# Patient Record
Sex: MALE | Race: WHITE | NOT HISPANIC OR LATINO | Employment: UNEMPLOYED
[De-identification: names, ages, dates, MRNs, and addresses within clinical notes are randomized per-mention and may not be internally consistent; named-entity substitution may affect disease eponyms.]

---

## 2017-01-03 ENCOUNTER — RECORDS - HEALTHEAST (OUTPATIENT)
Dept: ADMINISTRATIVE | Facility: OTHER | Age: 27
End: 2017-01-03

## 2017-01-09 ENCOUNTER — RECORDS - HEALTHEAST (OUTPATIENT)
Dept: ADMINISTRATIVE | Facility: OTHER | Age: 27
End: 2017-01-09

## 2017-01-11 ENCOUNTER — RECORDS - HEALTHEAST (OUTPATIENT)
Dept: ADMINISTRATIVE | Facility: OTHER | Age: 27
End: 2017-01-11

## 2017-02-01 ENCOUNTER — RECORDS - HEALTHEAST (OUTPATIENT)
Dept: ADMINISTRATIVE | Facility: OTHER | Age: 27
End: 2017-02-01

## 2017-02-06 ENCOUNTER — RECORDS - HEALTHEAST (OUTPATIENT)
Dept: ADMINISTRATIVE | Facility: OTHER | Age: 27
End: 2017-02-06

## 2017-02-06 LAB — HBA1C MFR BLD: 8.2 % (ref 0–5.6)

## 2017-02-07 ENCOUNTER — RECORDS - HEALTHEAST (OUTPATIENT)
Dept: ADMINISTRATIVE | Facility: OTHER | Age: 27
End: 2017-02-07

## 2017-02-08 ENCOUNTER — RECORDS - HEALTHEAST (OUTPATIENT)
Dept: ADMINISTRATIVE | Facility: OTHER | Age: 27
End: 2017-02-08

## 2017-02-13 ENCOUNTER — RECORDS - HEALTHEAST (OUTPATIENT)
Dept: ADMINISTRATIVE | Facility: OTHER | Age: 27
End: 2017-02-13

## 2017-02-26 ENCOUNTER — RECORDS - HEALTHEAST (OUTPATIENT)
Dept: ADMINISTRATIVE | Facility: OTHER | Age: 27
End: 2017-02-26

## 2017-02-27 ENCOUNTER — RECORDS - HEALTHEAST (OUTPATIENT)
Dept: ADMINISTRATIVE | Facility: OTHER | Age: 27
End: 2017-02-27

## 2017-03-02 ENCOUNTER — RECORDS - HEALTHEAST (OUTPATIENT)
Dept: ADMINISTRATIVE | Facility: OTHER | Age: 27
End: 2017-03-02

## 2017-03-10 ENCOUNTER — RECORDS - HEALTHEAST (OUTPATIENT)
Dept: ADMINISTRATIVE | Facility: OTHER | Age: 27
End: 2017-03-10

## 2017-03-16 ENCOUNTER — RECORDS - HEALTHEAST (OUTPATIENT)
Dept: ADMINISTRATIVE | Facility: OTHER | Age: 27
End: 2017-03-16

## 2017-04-05 ENCOUNTER — RECORDS - HEALTHEAST (OUTPATIENT)
Dept: ADMINISTRATIVE | Facility: OTHER | Age: 27
End: 2017-04-05

## 2017-04-29 ENCOUNTER — RECORDS - HEALTHEAST (OUTPATIENT)
Dept: ADMINISTRATIVE | Facility: OTHER | Age: 27
End: 2017-04-29

## 2017-05-18 ENCOUNTER — RECORDS - HEALTHEAST (OUTPATIENT)
Dept: ADMINISTRATIVE | Facility: OTHER | Age: 27
End: 2017-05-18

## 2017-05-18 LAB — HBA1C MFR BLD: 7.1 % (ref 0–5.6)

## 2017-05-21 ENCOUNTER — RECORDS - HEALTHEAST (OUTPATIENT)
Dept: ADMINISTRATIVE | Facility: OTHER | Age: 27
End: 2017-05-21

## 2017-05-22 ENCOUNTER — RECORDS - HEALTHEAST (OUTPATIENT)
Dept: ADMINISTRATIVE | Facility: OTHER | Age: 27
End: 2017-05-22

## 2017-05-26 ENCOUNTER — RECORDS - HEALTHEAST (OUTPATIENT)
Dept: ADMINISTRATIVE | Facility: OTHER | Age: 27
End: 2017-05-26

## 2017-06-05 ENCOUNTER — RECORDS - HEALTHEAST (OUTPATIENT)
Dept: ADMINISTRATIVE | Facility: OTHER | Age: 27
End: 2017-06-05

## 2017-08-18 ENCOUNTER — RECORDS - HEALTHEAST (OUTPATIENT)
Dept: ADMINISTRATIVE | Facility: OTHER | Age: 27
End: 2017-08-18

## 2017-08-22 ENCOUNTER — RECORDS - HEALTHEAST (OUTPATIENT)
Dept: ADMINISTRATIVE | Facility: OTHER | Age: 27
End: 2017-08-22

## 2017-08-31 ENCOUNTER — RECORDS - HEALTHEAST (OUTPATIENT)
Dept: ADMINISTRATIVE | Facility: OTHER | Age: 27
End: 2017-08-31

## 2017-09-30 ENCOUNTER — RECORDS - HEALTHEAST (OUTPATIENT)
Dept: ADMINISTRATIVE | Facility: OTHER | Age: 27
End: 2017-09-30

## 2017-10-01 ENCOUNTER — RECORDS - HEALTHEAST (OUTPATIENT)
Dept: ADMINISTRATIVE | Facility: OTHER | Age: 27
End: 2017-10-01

## 2017-10-04 ENCOUNTER — RECORDS - HEALTHEAST (OUTPATIENT)
Dept: ADMINISTRATIVE | Facility: OTHER | Age: 27
End: 2017-10-04

## 2017-10-05 ENCOUNTER — RECORDS - HEALTHEAST (OUTPATIENT)
Dept: ADMINISTRATIVE | Facility: OTHER | Age: 27
End: 2017-10-05

## 2017-10-07 ENCOUNTER — RECORDS - HEALTHEAST (OUTPATIENT)
Dept: ADMINISTRATIVE | Facility: OTHER | Age: 27
End: 2017-10-07

## 2017-10-10 ENCOUNTER — RECORDS - HEALTHEAST (OUTPATIENT)
Dept: ADMINISTRATIVE | Facility: OTHER | Age: 27
End: 2017-10-10

## 2017-10-26 ENCOUNTER — RECORDS - HEALTHEAST (OUTPATIENT)
Dept: ADMINISTRATIVE | Facility: OTHER | Age: 27
End: 2017-10-26

## 2017-12-01 ENCOUNTER — RECORDS - HEALTHEAST (OUTPATIENT)
Dept: ADMINISTRATIVE | Facility: OTHER | Age: 27
End: 2017-12-01

## 2018-01-17 ENCOUNTER — RECORDS - HEALTHEAST (OUTPATIENT)
Dept: ADMINISTRATIVE | Facility: OTHER | Age: 28
End: 2018-01-17

## 2018-01-23 ENCOUNTER — RECORDS - HEALTHEAST (OUTPATIENT)
Dept: ADMINISTRATIVE | Facility: OTHER | Age: 28
End: 2018-01-23

## 2018-01-24 ENCOUNTER — RECORDS - HEALTHEAST (OUTPATIENT)
Dept: ADMINISTRATIVE | Facility: OTHER | Age: 28
End: 2018-01-24

## 2018-03-01 ENCOUNTER — RECORDS - HEALTHEAST (OUTPATIENT)
Dept: ADMINISTRATIVE | Facility: OTHER | Age: 28
End: 2018-03-01

## 2018-03-02 ENCOUNTER — RECORDS - HEALTHEAST (OUTPATIENT)
Dept: ADMINISTRATIVE | Facility: OTHER | Age: 28
End: 2018-03-02

## 2018-04-09 ENCOUNTER — RECORDS - HEALTHEAST (OUTPATIENT)
Dept: ADMINISTRATIVE | Facility: OTHER | Age: 28
End: 2018-04-09

## 2018-10-04 ENCOUNTER — RECORDS - HEALTHEAST (OUTPATIENT)
Dept: ADMINISTRATIVE | Facility: OTHER | Age: 28
End: 2018-10-04

## 2019-01-24 ENCOUNTER — OFFICE VISIT - HEALTHEAST (OUTPATIENT)
Dept: FAMILY MEDICINE | Facility: CLINIC | Age: 29
End: 2019-01-24

## 2019-01-24 DIAGNOSIS — E11.9 TYPE 2 DIABETES MELLITUS WITHOUT COMPLICATION, WITHOUT LONG-TERM CURRENT USE OF INSULIN (H): ICD-10-CM

## 2019-01-24 DIAGNOSIS — I10 BENIGN ESSENTIAL HYPERTENSION: ICD-10-CM

## 2019-01-24 DIAGNOSIS — F41.8 DEPRESSION WITH ANXIETY: ICD-10-CM

## 2019-01-24 RX ORDER — HYDROCHLOROTHIAZIDE 25 MG/1
TABLET ORAL
Status: SHIPPED | COMMUNITY
Start: 2016-02-29 | End: 2022-04-22

## 2019-01-24 RX ORDER — LISINOPRIL/HYDROCHLOROTHIAZIDE 10-12.5 MG
1 TABLET ORAL DAILY
Qty: 90 TABLET | Refills: 3 | Status: SHIPPED | OUTPATIENT
Start: 2019-01-24 | End: 2022-04-22

## 2019-01-24 RX ORDER — PAROXETINE 20 MG/1
20 TABLET, FILM COATED ORAL DAILY
Qty: 30 TABLET | Refills: 2 | Status: SHIPPED | OUTPATIENT
Start: 2019-01-24 | End: 2022-04-22

## 2019-01-24 ASSESSMENT — MIFFLIN-ST. JEOR: SCORE: 1990.26

## 2019-01-29 ENCOUNTER — COMMUNICATION - HEALTHEAST (OUTPATIENT)
Dept: SCHEDULING | Facility: CLINIC | Age: 29
End: 2019-01-29

## 2019-02-13 ENCOUNTER — RECORDS - HEALTHEAST (OUTPATIENT)
Dept: HEALTH INFORMATION MANAGEMENT | Facility: CLINIC | Age: 29
End: 2019-02-13

## 2019-05-08 ENCOUNTER — OFFICE VISIT - HEALTHEAST (OUTPATIENT)
Dept: FAMILY MEDICINE | Facility: CLINIC | Age: 29
End: 2019-05-08

## 2019-05-08 DIAGNOSIS — R10.9 RIGHT FLANK PAIN: ICD-10-CM

## 2019-07-24 ENCOUNTER — OFFICE VISIT - HEALTHEAST (OUTPATIENT)
Dept: FAMILY MEDICINE | Facility: CLINIC | Age: 29
End: 2019-07-24

## 2019-07-24 DIAGNOSIS — R07.89 CHEST TIGHTNESS: ICD-10-CM

## 2019-07-24 DIAGNOSIS — R05.9 COUGH: ICD-10-CM

## 2019-07-24 RX ORDER — ALBUTEROL SULFATE 90 UG/1
2 AEROSOL, METERED RESPIRATORY (INHALATION) EVERY 6 HOURS PRN
Qty: 1 EACH | Refills: 0 | Status: SHIPPED | OUTPATIENT
Start: 2019-07-24 | End: 2022-04-26

## 2019-07-30 LAB
ATRIAL RATE - MUSE: 81 BPM
DIASTOLIC BLOOD PRESSURE - MUSE: NORMAL MMHG
INTERPRETATION ECG - MUSE: NORMAL
P AXIS - MUSE: 24 DEGREES
PR INTERVAL - MUSE: 142 MS
QRS DURATION - MUSE: 82 MS
QT - MUSE: 340 MS
QTC - MUSE: 394 MS
R AXIS - MUSE: 60 DEGREES
SYSTOLIC BLOOD PRESSURE - MUSE: NORMAL MMHG
T AXIS - MUSE: 60 DEGREES
VENTRICULAR RATE- MUSE: 81 BPM

## 2019-09-29 ENCOUNTER — OFFICE VISIT - HEALTHEAST (OUTPATIENT)
Dept: FAMILY MEDICINE | Facility: CLINIC | Age: 29
End: 2019-09-29

## 2019-09-29 DIAGNOSIS — H66.92 ACUTE OTITIS MEDIA, LEFT: ICD-10-CM

## 2020-03-21 ENCOUNTER — COMMUNICATION - HEALTHEAST (OUTPATIENT)
Dept: SCHEDULING | Facility: CLINIC | Age: 30
End: 2020-03-21

## 2021-05-28 NOTE — PROGRESS NOTES
Chief Complaint   Patient presents with     Abdominal Pain     right side pain when coughing xtoday 2019         HPI      Patient is here for moderate right flank pain started today. He also started a cough today. Coughing makes the right flank pain worsen. He denied nausea, vomiting, diarrhea, constipation, urinary symptoms. No hx of renal calculus, abdominal surgeries. No fever, chills.     ROS: Pertinent ROS noted in HPI.     Allergies   Allergen Reactions     Erythromycin-Sulfisoxazole Unknown       Patient Active Problem List   Diagnosis     Essential hypertension     Generalized anxiety disorder     Hyperlipidemia     Recurrent major depressive disorder (H)     Shortness of breath     Type 2 diabetes mellitus (H)     Tobacco dependence syndrome       Family History   Problem Relation Age of Onset     Stroke Father         just  of massive stroke last week       Social History     Socioeconomic History     Marital status: Single     Spouse name: Not on file     Number of children: Not on file     Years of education: Not on file     Highest education level: Not on file   Occupational History     Not on file   Social Needs     Financial resource strain: Not on file     Food insecurity:     Worry: Not on file     Inability: Not on file     Transportation needs:     Medical: Not on file     Non-medical: Not on file   Tobacco Use     Smoking status: Current Every Day Smoker     Last attempt to quit: 2019     Years since quittin.3     Smokeless tobacco: Never Used   Substance and Sexual Activity     Alcohol use: No     Drug use: No     Sexual activity: Not on file   Lifestyle     Physical activity:     Days per week: Not on file     Minutes per session: Not on file     Stress: Not on file   Relationships     Social connections:     Talks on phone: Not on file     Gets together: Not on file     Attends L[employer] day service: Not on file     Active member of club or organization: Not on file     Attends  meetings of clubs or organizations: Not on file     Relationship status: Not on file     Intimate partner violence:     Fear of current or ex partner: Not on file     Emotionally abused: Not on file     Physically abused: Not on file     Forced sexual activity: Not on file   Other Topics Concern     Not on file   Social History Narrative     Not on file         Objective:      Vitals:    05/08/19 1914 05/08/19 1916   BP: (!) 154/102 (!) 154/108   Patient Site: Right Arm Right Arm   Patient Position: Sitting Sitting   Cuff Size: Adult Large Adult Large   Pulse: 96    Resp: 16    Temp: 98.7  F (37.1  C)    TempSrc: Oral    SpO2: 97%    Weight: (!) 225 lb (102.1 kg)        Gen: well appearing  CV: RRR, normal S1S2, no M, R, G  Pulm: CTAB, normal effort  Abd: normal inspection, normal bowel sounds, mild pain to palpation at right flank, no mass/HSM    Assessment:    Right flank pain    Plan:  Patient near closing time. Recommended further evaluation in ER to rule out renal calculus vs other pathologies. However, patient declined going to ER. I explained to him the risks associated with not being thoroughly evaluated and treated tonight, including death and he verbalized understanding of associated risks. He said he will return to [facility] tomorrow.

## 2021-05-30 NOTE — PROGRESS NOTES
Chief Complaint   Patient presents with     poss Chest  Tightness     x2-3days Head pain come in go          HPI:      Patient is here for 2 days of cough, and one day of intermittent chest tightness with intermittent shortness of breath. No fever, chills, chest pain, abdominal pain. Patient is a heavy smoker.     ROS: Pertinent ROS noted in HPI.     Allergies   Allergen Reactions     Erythromycin-Sulfisoxazole Unknown       Patient Active Problem List   Diagnosis     Essential hypertension     Generalized anxiety disorder     Hyperlipidemia     Recurrent major depressive disorder (H)     Shortness of breath     Type 2 diabetes mellitus (H)     Tobacco dependence syndrome       Family History   Problem Relation Age of Onset     Stroke Father         just  of massive stroke last week       Social History     Socioeconomic History     Marital status: Single     Spouse name: Not on file     Number of children: Not on file     Years of education: Not on file     Highest education level: Not on file   Occupational History     Not on file   Social Needs     Financial resource strain: Not on file     Food insecurity:     Worry: Not on file     Inability: Not on file     Transportation needs:     Medical: Not on file     Non-medical: Not on file   Tobacco Use     Smoking status: Current Every Day Smoker     Last attempt to quit: 2019     Years since quittin.5     Smokeless tobacco: Current User   Substance and Sexual Activity     Alcohol use: No     Drug use: No     Sexual activity: Not on file   Lifestyle     Physical activity:     Days per week: Not on file     Minutes per session: Not on file     Stress: Not on file   Relationships     Social connections:     Talks on phone: Not on file     Gets together: Not on file     Attends [employer] service: Not on file     Active member of club or organization: Not on file     Attends meetings of clubs or organizations: Not on file     Relationship status: Not on file      Intimate partner violence:     Fear of current or ex partner: Not on file     Emotionally abused: Not on file     Physically abused: Not on file     Forced sexual activity: Not on file   Other Topics Concern     Not on file   Social History Narrative     Not on file         Objective:      Vitals:    07/24/19 1730 07/24/19 1732   BP: 158/82 145/85   Pulse: 98    Resp: 19    Temp: 98  F (36.7  C)    TempSrc: Oral    SpO2: 97%    Weight: 214 lb (97.1 kg)        Gen: NAD  CV: RRR, normal S1S2, no M, R, G  Pulm: CTAB, normal effort  Chest wall: normal palpation  Abd: normal inspection, normal bowel sounds, soft, no pain, no mass/HSM    EKG - normal sinus rhythm without acute abnormalities per my interpretation, discussed during visit.     Recent Results (from the past 24 hour(s))   Electrocardiogram Perform - Clinic   Result Value Ref Range    SYSTOLIC BLOOD PRESSURE  mmHg    DIASTOLIC BLOOD PRESSURE  mmHg    VENTRICULAR RATE 81 BPM    ATRIAL RATE 81 BPM    P-R INTERVAL 142 ms    QRS DURATION 82 ms    Q-T INTERVAL 340 ms    QTC CALCULATION (BEZET) 394 ms    P Axis 24 degrees    R AXIS 60 degrees    T AXIS 60 degrees    MUSE DIAGNOSIS       Normal sinus rhythm  Normal ECG  When compared with ECG of 01-NOV-2018 00:37,  No significant change was found       CXR - negative chest per my interpretation, discussed during visit.      Chest tightness  -     Electrocardiogram Perform - Clinic  -     albuterol (PROAIR HFA;PROVENTIL HFA;VENTOLIN HFA) 90 mcg/actuation inhaler; Inhale 2 puffs every 6 (six) hours as needed.    Cough  -     XR Chest 2 Views  -     albuterol (PROAIR HFA;PROVENTIL HFA;VENTOLIN HFA) 90 mcg/actuation inhaler; Inhale 2 puffs every 6 (six) hours as needed.      Normal exam. Patient appeared well. Suspect a combination of smoking with viral illness. Close f/u as directed.

## 2021-06-01 NOTE — PROGRESS NOTES
Assessment and Plan     Bryan was seen today for ear pain.    Diagnoses and all orders for this visit:    Acute otitis media, left  -     amoxicillin-clavulanate (AUGMENTIN) 875-125 mg per tablet; Take 1 tablet by mouth 2 (two) times a day for 10 days.         HPI     Chief Complaint   Patient presents with     Ear Pain     crackling in ear, started this morning       Bryan Grant is a 29 y.o. male seen today for left ear pain.     Onset of rhinorrhea, nasal congestion, and cough yesterday.  This morning noted decreased hearing in left ear with crackling and ear pain.    Denies headache, dyspnea, [place], fevers, chills.         Current Outpatient Medications:      albuterol (PROAIR HFA;PROVENTIL HFA;VENTOLIN HFA) 90 mcg/actuation inhaler, Inhale 2 puffs every 6 (six) hours as needed., Disp: 1 each, Rfl: 0     amoxicillin-clavulanate (AUGMENTIN) 875-125 mg per tablet, Take 1 tablet by mouth 2 (two) times a day for 10 days., Disp: 20 tablet, Rfl: 0     hydroCHLOROthiazide (HYDRODIURIL) 25 MG tablet, TAKE 1 TABLET PO DAILY, Disp: , Rfl:      ibuprofen (ADVIL,MOTRIN) 600 MG tablet, Take 1 tablet (600 mg total) by mouth every 6 (six) hours as needed for pain., Disp: 40 tablet, Rfl: 0     lisinopril-hydrochlorothiazide (PRINZIDE,ZESTORETIC) 10-12.5 mg per tablet, Take 1 tablet by mouth daily., Disp: 90 tablet, Rfl: 3     metFORMIN (GLUCOPHAGE) 1000 MG tablet, Take 0.5 tablets (500 mg total) by mouth 2 (two) times a day with meals., Disp: 180 tablet, Rfl: 3     PARoxetine (PAXIL) 20 MG tablet, Take 1 tablet (20 mg total) by mouth daily., Disp: 30 tablet, Rfl: 2     Reviewed and updated: medical history, medications and allergies.     Review of Systems     General: Denies fever, chills, fatigue.  Cardiovascular: Denies chest pain, dyspnea on exertion, palpitations.  Respiratory: Denies dyspnea, cough, wheezing.  GI: Denies nausea, vomiting, diarrhea, constipation.     Objective     Vitals:    09/29/19 0849   BP:  (!) 139/92   Pulse: 93   Resp: 18   Temp: 98.3  F (36.8  C)   TempSrc: Oral   SpO2: 97%   Weight: (!) 242 lb 8 oz (110 kg)        Reviewed vital signs.  General: Appears calm, comfortable. Answers questions quickly and appropriately with clear speech. No apparent distress.  Skin: Pink, warm, dry.  HENT: Normocephalic, atraumatic.  Right TM and canal clear.  Left TM erythematous and bulging with some mild erythema extending into the canal a few millimeters.  No evidence of rupture.  No pain with manipulation of the left pinna or compression of the tragus.  No lymphadenopathy.  Neck: Supple, without lymphadenopathy or thyromegaly.  Cardiovascular: Regular rate and rhythm, clear S1/S2 without murmur, rub, or gallop.  Respiratory: Lung sounds are clear and equal bilaterally. Normal respiratory effort.  Neuro: Memory and cognition appear normal. Normal gait.  Psych: Mood and affect appear normal.     Imaging:   No results found.    Labs:  No results found for this or any previous visit (from the past 24 hour(s)).     Medical Decision-Making     Bryan is generally well-appearing 29-year-old male history of hypertension, hyperlipidemia, type II DM, anxiety, and depression who presents with 1 day of URI symptoms and less than 12 hours of left ear pain crackling, and decreased hearing.  Only abnormal exam finding is an erythematous and bulging left TM with a small amount of erythema extending into the canal.  Presentation is consistent with a viral URI with left eustachian dysfunction resulting in a left AOM.  Prescribed a 10-day course of Augmentin.    Reviewed red flags that would trigger a prompt return to the clinic as noted below under patient instructions.  He expressed understanding of these directions and is in agreement with the plan.     Patient Instructions     Patient Instructions   Please return to the clinic if you notice any of the following:    High fever that does not respond to Tylenol or  ibuprofen.    Pain seems to be getting worse instead of better.    Liquid coming from either ear.    Not better in about 5 days.          Discussed benefit vs risk of medications, dosing, side effects.  Patient was able to verbalize understanding.  After visit summary was provided for patient.     Rakan Campos PA-C

## 2021-06-01 NOTE — PATIENT INSTRUCTIONS - HE
Please return to the clinic if you notice any of the following:    High fever that does not respond to Tylenol or ibuprofen.    Pain seems to be getting worse instead of better.    Liquid coming from either ear.    Not better in about 5 days.

## 2021-06-02 ENCOUNTER — RECORDS - HEALTHEAST (OUTPATIENT)
Dept: ADMINISTRATIVE | Facility: CLINIC | Age: 31
End: 2021-06-02

## 2021-06-02 VITALS — BODY MASS INDEX: 34.05 KG/M2 | WEIGHT: 229.9 LBS | HEIGHT: 69 IN

## 2021-06-03 VITALS
HEART RATE: 93 BPM | TEMPERATURE: 98.3 F | SYSTOLIC BLOOD PRESSURE: 139 MMHG | BODY MASS INDEX: 36.34 KG/M2 | WEIGHT: 242.5 LBS | DIASTOLIC BLOOD PRESSURE: 92 MMHG | RESPIRATION RATE: 18 BRPM | OXYGEN SATURATION: 97 %

## 2021-06-03 VITALS — BODY MASS INDEX: 32.07 KG/M2 | WEIGHT: 214 LBS

## 2021-06-03 VITALS — BODY MASS INDEX: 33.71 KG/M2 | WEIGHT: 225 LBS

## 2021-06-07 NOTE — TELEPHONE ENCOUNTER
Left ear pain - ear is clogged so cannot hear    Also has some facial pain    He states that he gets this every spring and fall    His nose is somewhat blocked    Nasal congestion - green congestion    No fever    cough    No breathing issues    No severe headache     No swelling of the cheeks, forehead, around the eye    Recommended being seen. Given information for [facility].    Esther White, RN   [facility] Medication Refill and Triage Nurse  11:02 AM  3/21/2020    Reason for Disposition    Earache    Protocols used: SINUS PAIN OR CONGESTION-A-AH    COVID 19 Nurse Triage Plan    Please be aware that novel coronavirus (COVID-19) may be circulating in the community. If you develop symptoms such as fever, cough, or SOB or if you have concerns about the presence of another infection including coronavirus (COVID-19), please contact your health care provider or visit [email or web address].     Patient HAS NO known exposure, fever, cough or SOB in addition to reason for call.    Additional General Information About COVID-19    COVID-19 - General Information:  Regardless of if you have been tested or not:  Patient who have symptoms (cough, fever, or shortness of breath), need to isolate for 7 days from when symptoms started OR 72 hours after fever resolves (without fever reducing medications) AND improvement of respiratory symptoms (whichever is longer).      Isolate yourself at home (in own room/own bathroom if possible)    Do Not allow any visitors    Do Not go to work or school    Do Not go to [employer],  centers, shopping, or other public places.    Do Not shake hands.    Avoid close and intimate contact with others (hugging, kissing).    Follow CDC recommendations for household cleaning of frequently touched services.     After the initial 7 days, continue to isolate yourself from household members as much as possible. To continue decrease the risk of community spread and exposure, you and any members of  your household should limit activities in public for 14 days after starting home isolation.     You can reference the following CDC link for helpful home isolation/care tips:  https://www.cdc.gov/coronavirus/2019-ncov/downloads/10Things.pdf    COVID-19 - Symptoms:     The COVID-19 can cause a respiratory illness, such as bronchitis or pneumonia.    The most common symptoms are: cough, fever, and shortness of breath.     Other symptoms are: body aches, chills, diarrhea, fatigue, headache, runny nose, and sore throat     COVID-19 - Exposure Risk Factors:    Exposure to a person who has been diagnosed with COVID-19 .    Travel from an area with recent local transmission of COVID-19 .    The CDC (www.cdc.gov) has the most up-to-date list of where the COVID-19 outbreak is occurring.    COVID-19 - Spreading:     The virus likely spreads through respiratory droplets produced when a person coughs or sneezes. These respiratory droplets can travel approximately 6 feet and can remain on surfaces.  Common disinfectants will kill the virus.    The CDC currently does not recommend healthy people wearing masks.    COVID-19 - Protect Yourself:     Avoid close contact with people known to have this new COVID-19 infection.    Wash hands often with soap and water or alcohol-based hand .    Avoid touching the eyes, nose or mouth.       Thank you for limiting contact with others, wearing a simple mask to cover your cough, practice good hand hygiene habits and accessing our virtual services where possible to limit the spread of this virus.    For more information about COVID19 and options for caring for yourself at home, please visit the CDC website at https://www.cdc.gov/coronavirus/2019-ncov/about/steps-when-sick.html  For more options for care at [facility], please visit our website at [email or web address]

## 2021-06-16 PROBLEM — E78.5 HYPERLIPIDEMIA: Status: ACTIVE | Noted: 2017-02-27

## 2021-06-16 PROBLEM — F17.200 TOBACCO DEPENDENCE SYNDROME: Status: ACTIVE | Noted: 2017-02-06

## 2021-06-16 PROBLEM — I10 ESSENTIAL HYPERTENSION: Status: ACTIVE | Noted: 2017-02-06

## 2021-06-16 PROBLEM — F33.9 RECURRENT MAJOR DEPRESSIVE DISORDER (H): Status: ACTIVE | Noted: 2017-11-14

## 2021-06-16 PROBLEM — E11.9 TYPE 2 DIABETES MELLITUS (H): Status: ACTIVE | Noted: 2017-02-06

## 2021-06-23 NOTE — PROGRESS NOTES
HPI:  Bryan Grant is a 28 y.o. male who is seen for   Chief Complaint   Patient presents with     Establish Care     anger management     per patient   Bryan Grant states that he needs to establish care and get refills of his blood pressure medication and his diabetes medication.  He is also been having problems controlling his anger.  He has a new baby on the way with his girlfriend and he wants to get his mood under better control.  He has a history of anxiety and depression, was treated 1 year ago for this but is not currently on medication.  He denies suicidal or homicidal ideations.  ROS: Patient denies fever, chills, sweats, fainting, fatigue, weight change, dizziness, sleep problems, chest pain, palpitations, shortness of breath, wheezing, cough,  sore throat, changes in hearing, ear pain,tinnitus,  disphagia, sore throat, globus, changes in vision, eye pain eye redness,nausea, vomiting, diarrhea, constipation, black or bloody stools,  Dysuria, frequency, urinary incontinence, nocturia, hematuria, back pain,joint pain, bone pain, muscle cramps,edema, weakness, numbness, tingling of extremities, rash, itching, skin changes, swollen lymph nodes, thirst, increased urination, breast lumps, breast pain, nipple discharge, memory difficulties, (female)vaginal discharge, dyspareunia, menorrhagia, pelvic pain, sexual dysfunction,   (male) testicular lumps, erectile dysfunction.    No results found for: HGBA1C  Lab Results   Component Value Date    CREATININE 0.96 2018     There is no problem list on file for this patient.    Family History   Problem Relation Age of Onset     Stroke Father         just  of massive stroke last week     Social History     Socioeconomic History     Marital status: Single     Spouse name: None     Number of children: None     Years of education: None     Highest education level: None   Social Needs     Financial resource strain: None     Food insecurity -  worry: None     Food insecurity - inability: None     Transportation needs - medical: None     Transportation needs - non-medical: None   Occupational History     None   Tobacco Use     Smoking status: Former Smoker     Last attempt to quit: 2019     Years since quittin.0     Smokeless tobacco: Never Used   Substance and Sexual Activity     Alcohol use: No     Drug use: No     Sexual activity: None   Other Topics Concern     None   Social History Narrative     None     Past Surgical History:   Procedure Laterality Date     arm surgery       Current Outpatient Medications on File Prior to Visit   Medication Sig Dispense Refill     hydroCHLOROthiazide (HYDRODIURIL) 25 MG tablet TAKE 1 TABLET PO DAILY       ibuprofen (ADVIL,MOTRIN) 600 MG tablet Take 1 tablet (600 mg total) by mouth every 6 (six) hours as needed for pain. 40 tablet 0     No current facility-administered medications on file prior to visit.      Allergies   Allergen Reactions     Erythromycin-Sulfisoxazole Unknown       I have reviewed the patient's medical history in detail and updated the computerized patient record.  OBJECTIVE:  Wt Readings from Last 3 Encounters:   19 (!) 229 lb 14.4 oz (104.3 kg)   18 (!) 235 lb (106.6 kg)   18 (!) 228 lb (103.4 kg)     Temp Readings from Last 3 Encounters:   19 99.2  F (37.3  C) (Oral)   18 99.1  F (37.3  C) (Oral)   18 98.9  F (37.2  C) (Oral)     BP Readings from Last 3 Encounters:   19 (!) 148/94   18 (!) 184/117   18 135/86     Pulse Readings from Last 3 Encounters:   19 (!) 102   18 100   18 92     Body mass index is 34.45 kg/m .     Alert, cooperative, well-hydrated. Appears well.  Eyes: Pupils equal, round, reactive to light.  HEENT: Sclera white, nares patent, MMM, TM's pearly bilaterally  Neck: supple, without lymphadenopathy, Thyroid freely movable and without hypotrophy or nodularity.   Lungs: Clear to auscultation. No  retractions, no increased work of respiration, equal chest rise.   Heart: Regular rate and rhythm, no murmurs, clicks,   Gallops.  Abdomen: Soft, bowel sounds in 4 quadrants with no tenderness to palpation, no organomegaly or masses, no aortic or renal bruits.  Extremities: no tenderness to palpation of gastrocnemius, bilaterally.  Skin: no increased warmth, edema, or erythema of lower legs bilaterally.  Back: No cervical, thoracic or lumbar tenderness to spinous processes or musculature.  Neuro::pupils equal and reactive to light bilaterally, CN II - XII grossly intact. No focal motor/sensory deficits. DTR 2/4 all 4 extremities. Muscle Strength 5/5 all extremities, Rhomberg negative  Labs:  Admission on 11/01/2018, Discharged on 11/01/2018   Component Date Value     Sodium 11/01/2018 140      Potassium 11/01/2018 3.7      Chloride 11/01/2018 105      CO2 11/01/2018 23      Anion Gap, Calculation 11/01/2018 12      Glucose 11/01/2018 185*     BUN 11/01/2018 19      Creatinine 11/01/2018 0.96      GFR MDRD Af Amer 11/01/2018 >60      GFR MDRD Non Af Amer 11/01/2018 >60      Bilirubin, Total 11/01/2018 0.2      Calcium 11/01/2018 9.7      Protein, Total 11/01/2018 7.3      Albumin 11/01/2018 4.3      Alkaline Phosphatase 11/01/2018 77      AST 11/01/2018 21      ALT 11/01/2018 34      Troponin I 11/01/2018 <0.01      VENTRICULAR RATE 11/01/2018 84      ATRIAL RATE 11/01/2018 84      P-R INTERVAL 11/01/2018 156      QRS DURATION 11/01/2018 82      Q-T INTERVAL 11/01/2018 354      QTC CALCULATION (BEZET) 11/01/2018 418      P [place] 11/01/2018 16      R AXIS 11/01/2018 65      T AXIS 11/01/2018 57      MUSE DIAGNOSIS 11/01/2018                      Value:Sinus rhythm  Normal ECG  When compared with ECG of 20-AUG-2018 12:23,  No significant change was found  Confirmed by KEY MATUTE MD LOC:[facility] ([id number]) on 11/1/2018 5:07:09 PM       WBC 11/01/2018 10.2      RBC 11/01/2018 4.90      Hemoglobin 11/01/2018 15.1       Hematocrit 11/01/2018 42.5      MCV 11/01/2018 87      MCH 11/01/2018 30.8      MCHC 11/01/2018 35.5      RDW 11/01/2018 11.6      Platelets 11/01/2018 273      MPV 11/01/2018 9.3      Neutrophils % 11/01/2018 48*     Lymphocytes % 11/01/2018 43*     Monocytes % 11/01/2018 8      Eosinophils % 11/01/2018 1      Basophils % 11/01/2018 0      Neutrophils Absolute 11/01/2018 4.9      Lymphocytes Absolute 11/01/2018 4.3      Monocytes Absolute 11/01/2018 0.8      Eosinophils Absolute 11/01/2018 0.1      Basophils Absolute 11/01/2018 0.0      ASSESMENT/PLAN:  1. Depression with anxiety  PARoxetine (PAXIL) 20 MG tablet   2. Type 2 diabetes mellitus without complication, without long-term current use of insulin (H)  metFORMIN (GLUCOPHAGE) 1000 MG tablet    Basic Metabolic Panel    Glycosylated Hemoglobin A1c    Thyroid Stimulating Hormone (TSH)   3. Benign essential hypertension  lisinopril-hydrochlorothiazide (PRINZIDE,ZESTORETIC) 10-12.5 mg per tablet    Basic Metabolic Panel    Thyroid Stimulating Hormone (TSH)     Follow-up in 6 weeks for recheck, discussed mechanism of action of serotonin reuptake inhibitors.  Also discussed the use of BuSpar for anxiety.  Advised to follow-up in 6 weeks for recheck and will adjust medication.  ER if symptoms worsen during treatment. Make a verbal contract with a family member today to take you to the ER if you develop any suicidal thoughts and check in with this person daily about your symptoms.      Stacie Alvarez, MS, PA-C 01/25/19

## 2021-06-23 NOTE — TELEPHONE ENCOUNTER
Called him back at 10:48 and he was at work and unable to talk and asked for a call back. He then hung up before I could recommend any follow up or advise.    Please call him and let him know that he should take metformin with food.     He should also return for recheck if nausea, vomiting or diarrhea continue since he could have a viral stomach flu or medication reaction. ER if symptoms are worsening and he may see any available provider or me as soon as possible.

## 2021-06-23 NOTE — TELEPHONE ENCOUNTER
Triage note:    28 year old male called with concern about vomiting and diarrhea after starting new medication.    He started Lisinopril-hctz, metformin, Paxil yesterday and didn't have any problems, but this morning he took his medication at 530 am then ate a half sandwich an hour to two later.  Shortly afterwards he developed intermittent abdominal cramping and diarrhea then he just vomited within the last 15 minutes.  He feels like he is in a 'cold sweat'.  No fever. He has had 3 diarrhea this morning so far. He states that he had the stomach flu last month and 'doesn't feel sick'.  He is concerned the medications are making him sick. He took Metformin a long time ago and never had symptoms this bad.     Rn triaged to discuss with PCP and call back patient today. He agreed. RN gave care advice, per guideline. Please advise if his medication are causing symptoms?  Or if it may be a virus?  Any additional advice?    *His Voicemail isn't working now.           Reason for Disposition    Vomiting a prescription medication    Protocols used: VOMITING-A-OH

## 2021-06-23 NOTE — TELEPHONE ENCOUNTER
Called and spoke with the patient and gave the recommendations and instruction per Stacie Sosa. Patient understood and thanked for the call.

## 2021-10-20 ENCOUNTER — HOSPITAL ENCOUNTER (EMERGENCY)
Facility: HOSPITAL | Age: 31
Discharge: HOME | End: 2021-10-20

## 2021-10-20 VITALS
DIASTOLIC BLOOD PRESSURE: 93 MMHG | HEIGHT: 70 IN | WEIGHT: 222 LBS | HEART RATE: 115 BPM | BODY MASS INDEX: 31.78 KG/M2 | SYSTOLIC BLOOD PRESSURE: 144 MMHG | OXYGEN SATURATION: 96 % | TEMPERATURE: 97.8 F | RESPIRATION RATE: 18 BRPM

## 2021-10-20 ASSESSMENT — MIFFLIN-ST. JEOR: SCORE: 1978.24

## 2021-10-20 NOTE — ED TRIAGE NOTES
"Pt is amb with normal appearing gait into triage. He reports that he injured his back at work on Sept. 29. \"they  ( health) aren't  doing anything for me. Has been going to PT. He reports low to mid back pain. Last dosed with ibuprofen 2 hrs ago.He denies loss of bowel/bladder control. Reports occ numbness back of both legs.  "

## 2021-12-17 ENCOUNTER — HOSPITAL ENCOUNTER (EMERGENCY)
Facility: HOSPITAL | Age: 31
Discharge: HOME | End: 2021-12-17
Admitting: NURSE PRACTITIONER
Payer: OTHER GOVERNMENT

## 2021-12-17 ENCOUNTER — APPOINTMENT (OUTPATIENT)
Dept: CT IMAGING | Facility: HOSPITAL | Age: 31
End: 2021-12-17
Payer: OTHER GOVERNMENT

## 2021-12-17 VITALS
DIASTOLIC BLOOD PRESSURE: 90 MMHG | HEART RATE: 104 BPM | WEIGHT: 210 LBS | BODY MASS INDEX: 30.06 KG/M2 | OXYGEN SATURATION: 98 % | HEIGHT: 70 IN | RESPIRATION RATE: 16 BRPM | SYSTOLIC BLOOD PRESSURE: 146 MMHG | TEMPERATURE: 98.8 F

## 2021-12-17 DIAGNOSIS — E11.65 HYPERGLYCEMIA DUE TO DIABETES MELLITUS (H): ICD-10-CM

## 2021-12-17 DIAGNOSIS — R91.8 PULMONARY NODULES: ICD-10-CM

## 2021-12-17 DIAGNOSIS — U07.1 INFECTION DUE TO 2019 NOVEL CORONAVIRUS: ICD-10-CM

## 2021-12-17 DIAGNOSIS — K04.7 DENTAL ABSCESS: ICD-10-CM

## 2021-12-17 LAB
ALBUMIN SERPL-MCNC: 4.2 G/DL (ref 3.5–5)
ALP SERPL-CCNC: 83 U/L (ref 45–120)
ALT SERPL W P-5'-P-CCNC: 29 U/L (ref 0–45)
ANION GAP SERPL CALCULATED.3IONS-SCNC: 16 MMOL/L (ref 5–18)
AST SERPL W P-5'-P-CCNC: 18 U/L (ref 0–40)
ATRIAL RATE - MUSE: 112 BPM
BASE EXCESS BLDV CALC-SCNC: 0.5 MMOL/L
BASOPHILS # BLD AUTO: 0 10E3/UL (ref 0–0.2)
BASOPHILS NFR BLD AUTO: 0 %
BILIRUB SERPL-MCNC: 0.5 MG/DL (ref 0–1)
BUN SERPL-MCNC: 14 MG/DL (ref 8–22)
CALCIUM SERPL-MCNC: 9.8 MG/DL (ref 8.5–10.5)
CHLORIDE BLD-SCNC: 94 MMOL/L (ref 98–107)
CO2 SERPL-SCNC: 22 MMOL/L (ref 22–31)
CREAT SERPL-MCNC: 1.14 MG/DL (ref 0.7–1.3)
DIASTOLIC BLOOD PRESSURE - MUSE: NORMAL MMHG
EOSINOPHIL # BLD AUTO: 0 10E3/UL (ref 0–0.7)
EOSINOPHIL NFR BLD AUTO: 0 %
ERYTHROCYTE [DISTWIDTH] IN BLOOD BY AUTOMATED COUNT: 11.6 % (ref 10–15)
GFR SERPL CREATININE-BSD FRML MDRD: 86 ML/MIN/1.73M2
GLUCOSE BLD-MCNC: 456 MG/DL (ref 70–125)
HCO3 BLDV-SCNC: 25 MMOL/L (ref 24–30)
HCT VFR BLD AUTO: 50.9 % (ref 40–53)
HGB BLD-MCNC: 17.8 G/DL (ref 13.3–17.7)
IMM GRANULOCYTES # BLD: 0 10E3/UL
IMM GRANULOCYTES NFR BLD: 0 %
INTERPRETATION ECG - MUSE: NORMAL
KETONES BLD-SCNC: 0.11 MMOL/L
LACTATE SERPL-SCNC: 2 MMOL/L (ref 0.7–2)
LACTATE SERPL-SCNC: 3.4 MMOL/L (ref 0.7–2)
LYMPHOCYTES # BLD AUTO: 1.7 10E3/UL (ref 0.8–5.3)
LYMPHOCYTES NFR BLD AUTO: 22 %
MCH RBC QN AUTO: 30 PG (ref 26.5–33)
MCHC RBC AUTO-ENTMCNC: 35 G/DL (ref 31.5–36.5)
MCV RBC AUTO: 86 FL (ref 78–100)
MONOCYTES # BLD AUTO: 1.3 10E3/UL (ref 0–1.3)
MONOCYTES NFR BLD AUTO: 18 %
NEUTROPHILS # BLD AUTO: 4.4 10E3/UL (ref 1.6–8.3)
NEUTROPHILS NFR BLD AUTO: 60 %
NRBC # BLD AUTO: 0 10E3/UL
NRBC BLD AUTO-RTO: 0 /100
OXYHGB MFR BLDV: 84.3 % (ref 70–75)
P AXIS - MUSE: 40 DEGREES
PCO2 BLDV: 37 MM HG (ref 35–50)
PH BLDV: 7.43 [PH] (ref 7.35–7.45)
PLATELET # BLD AUTO: 240 10E3/UL (ref 150–450)
PO2 BLDV: 49 MM HG (ref 25–47)
POTASSIUM BLD-SCNC: 4.5 MMOL/L (ref 3.5–5)
PR INTERVAL - MUSE: 132 MS
PROT SERPL-MCNC: 8.1 G/DL (ref 6–8)
QRS DURATION - MUSE: 72 MS
QT - MUSE: 312 MS
QTC - MUSE: 425 MS
R AXIS - MUSE: 91 DEGREES
RBC # BLD AUTO: 5.93 10E6/UL (ref 4.4–5.9)
SAO2 % BLDV: 86.6 % (ref 70–75)
SODIUM SERPL-SCNC: 132 MMOL/L (ref 136–145)
SYSTOLIC BLOOD PRESSURE - MUSE: NORMAL MMHG
T AXIS - MUSE: 29 DEGREES
TROPONIN I SERPL-MCNC: 0.03 NG/ML (ref 0–0.29)
VENTRICULAR RATE- MUSE: 112 BPM
WBC # BLD AUTO: 7.5 10E3/UL (ref 4–11)

## 2021-12-17 PROCEDURE — 71275 CT ANGIOGRAPHY CHEST: CPT

## 2021-12-17 PROCEDURE — 250N000011 HC RX IP 250 OP 636: Performed by: NURSE PRACTITIONER

## 2021-12-17 PROCEDURE — 93005 ELECTROCARDIOGRAM TRACING: CPT | Performed by: NURSE PRACTITIONER

## 2021-12-17 PROCEDURE — 85025 COMPLETE CBC W/AUTO DIFF WBC: CPT | Performed by: EMERGENCY MEDICINE

## 2021-12-17 PROCEDURE — 80053 COMPREHEN METABOLIC PANEL: CPT | Performed by: EMERGENCY MEDICINE

## 2021-12-17 PROCEDURE — 99285 EMERGENCY DEPT VISIT HI MDM: CPT | Mod: 25

## 2021-12-17 PROCEDURE — 82010 KETONE BODYS QUAN: CPT | Performed by: EMERGENCY MEDICINE

## 2021-12-17 PROCEDURE — 96375 TX/PRO/DX INJ NEW DRUG ADDON: CPT

## 2021-12-17 PROCEDURE — 258N000003 HC RX IP 258 OP 636: Performed by: NURSE PRACTITIONER

## 2021-12-17 PROCEDURE — 250N000011 HC RX IP 250 OP 636: Performed by: EMERGENCY MEDICINE

## 2021-12-17 PROCEDURE — 96365 THER/PROPH/DIAG IV INF INIT: CPT | Mod: 59

## 2021-12-17 PROCEDURE — 96361 HYDRATE IV INFUSION ADD-ON: CPT

## 2021-12-17 PROCEDURE — 84484 ASSAY OF TROPONIN QUANT: CPT | Performed by: NURSE PRACTITIONER

## 2021-12-17 PROCEDURE — 36415 COLL VENOUS BLD VENIPUNCTURE: CPT | Performed by: EMERGENCY MEDICINE

## 2021-12-17 PROCEDURE — 83605 ASSAY OF LACTIC ACID: CPT | Performed by: EMERGENCY MEDICINE

## 2021-12-17 PROCEDURE — 36415 COLL VENOUS BLD VENIPUNCTURE: CPT | Performed by: NURSE PRACTITIONER

## 2021-12-17 PROCEDURE — 258N000003 HC RX IP 258 OP 636: Performed by: EMERGENCY MEDICINE

## 2021-12-17 PROCEDURE — 83605 ASSAY OF LACTIC ACID: CPT | Performed by: NURSE PRACTITIONER

## 2021-12-17 PROCEDURE — 70487 CT MAXILLOFACIAL W/DYE: CPT | Mod: XS

## 2021-12-17 PROCEDURE — 82805 BLOOD GASES W/O2 SATURATION: CPT | Performed by: EMERGENCY MEDICINE

## 2021-12-17 PROCEDURE — 87040 BLOOD CULTURE FOR BACTERIA: CPT | Performed by: NURSE PRACTITIONER

## 2021-12-17 RX ORDER — PIPERACILLIN SODIUM, TAZOBACTAM SODIUM 3; .375 G/15ML; G/15ML
3.38 INJECTION, POWDER, LYOPHILIZED, FOR SOLUTION INTRAVENOUS ONCE
Status: COMPLETED | OUTPATIENT
Start: 2021-12-17 | End: 2021-12-17

## 2021-12-17 RX ORDER — IOPAMIDOL 755 MG/ML
100 INJECTION, SOLUTION INTRAVASCULAR ONCE
Status: COMPLETED | OUTPATIENT
Start: 2021-12-17 | End: 2021-12-17

## 2021-12-17 RX ORDER — KETOROLAC TROMETHAMINE 30 MG/ML
15 INJECTION, SOLUTION INTRAMUSCULAR; INTRAVENOUS ONCE
Status: COMPLETED | OUTPATIENT
Start: 2021-12-17 | End: 2021-12-17

## 2021-12-17 RX ADMIN — SODIUM CHLORIDE 1000 ML: 9 INJECTION, SOLUTION INTRAVENOUS at 20:52

## 2021-12-17 RX ADMIN — KETOROLAC TROMETHAMINE 15 MG: 30 INJECTION, SOLUTION INTRAMUSCULAR; INTRAVENOUS at 18:05

## 2021-12-17 RX ADMIN — IOPAMIDOL 100 ML: 755 INJECTION, SOLUTION INTRAVENOUS at 21:50

## 2021-12-17 RX ADMIN — SODIUM CHLORIDE 1000 ML: 9 INJECTION, SOLUTION INTRAVENOUS at 18:02

## 2021-12-17 RX ADMIN — PIPERACILLIN SODIUM AND TAZOBACTAM SODIUM 3.38 G: 3; .375 INJECTION, POWDER, LYOPHILIZED, FOR SOLUTION INTRAVENOUS at 21:42

## 2021-12-17 ASSESSMENT — ENCOUNTER SYMPTOMS
VOMITING: 1
MYALGIAS: 1
FEVER: 0
COUGH: 1
SHORTNESS OF BREATH: 0
FACIAL SWELLING: 1

## 2021-12-17 ASSESSMENT — MIFFLIN-ST. JEOR: SCORE: 1923.8

## 2021-12-17 NOTE — ED PROVIDER NOTES
"ED Provider In Triage Note  [facility]  Encounter Date: Dec 17, 2021    Chief Complaint   Patient presents with     Dental Pain       Brief HPI:   Bryan Grant is a 31 year old male presenting to the Emergency Department with a chief complaint of dental pain with associated swelling and recent covid 19 diagnosis.  Pt reports covid symptoms starting yesterday, tested + yesterday, unvaccinated for covid.  Pt also having increased dental pain/swelling to left side of face with known dental issues.  Admits to cough, congestion, feeling \"out of it\".      Brief Physical Exam:  BP (!) 137/100   Pulse (!) 133   Temp 98.8  F (37.1  C) (Temporal)   Resp 20   Ht 1.778 m (5' 10\")   Wt 95.3 kg (210 lb)   SpO2 98%   BMI 30.13 kg/m    General: Non-toxic appearing  HEENT: Atraumatic, left facial swelling  Resp: No respiratory distress  Heart: Tachy, regular rhythm  Abdomen: Non-peritoneal  Neuro: Alert, oriented, answers questions appropriately  Psych: Behavior appropriate      Plan Initiated in Triage:  Orders Placed This Encounter     Comprehensive metabolic panel     Lactic acid whole blood     0.9% sodium chloride BOLUS     ketorolac (TORADOL) injection 15 mg       PIT Dispo:   Return to lobby while awaiting workup and ED bed availability.  Pt found to be tachy to 135 without fever here.  Pt is a diabetic with covid, dental infection and quite tachy. Will get labs, hydrate, reassess.      David Anaya MD on 12/17/2021 at 5:45 PM    Patient was evaluated by the Physician in Triage due to a limitation of available rooms in the Emergency Department. A plan of care was discussed based on the information obtained on the initial evaluation and patient was consuled to return back to the Emergency Department lobby after this initial evalutaiton until results were obtained or a room became available in the Emergency Department. Patient was counseled not to leave prior to receiving the " results of their workup.        David Anaya MD  12/17/21 8055

## 2021-12-17 NOTE — ED TRIAGE NOTES
Patient here for dental pain, of note he is covid positive but he has left sided mouth swelling. He has had covid sx for two days.

## 2021-12-18 NOTE — DISCHARGE INSTRUCTIONS
"The CT scan of your face does show extensive infection involving your teeth extending into your left maxillary sinus.     The CT scan of your chest does not show any blood clot or pneumonia. It does show nodules which will need follow up in one year. Your primary care doctor can order this repeat test one year from now.     For your dental infection:  take the antibiotic as prescribed.    Follow up with your dentist within one week    For your pain:   You should take ibuprofen, 600mg, threetimes per day as needed for pain.  You can also use acetaminophen, 650 mg, up to four times per day as needed for pain.  You can use these medications together, there are no concerning interactions.     Discharge Instructions for COVID-19 Patients  You have--or may have--COVID-19. Please follow the instructions listed below.   If you have a weakened immune system, discuss with your doctor any other actions you need to take.  How can I protect others?  If you have symptoms (fever, cough, body aches or trouble breathing):  Stay home and away from others (self-isolate) until:  Your other symptoms have resolved (gotten better). And   You've had no fever--and no medicine that reduces fever--for 1 full day (24 hours). And   At least 10 days have passed since your symptoms started. (You may need to wait 20 days. Follow the advice of your care team.)  If you don't show symptoms, but testing showed that you have COVID-19:  Stay home and away from others (self-isolate) until at least 10 days have passed since the date of your first positive COVID-19 test.  During this time  Stay in your own room, even for meals. Use your own bathroom if you can.  Stay away from others in your home. No hugging, kissing or shaking hands. No visitors.  Don't go to work, school or anywhere else.  Clean \"high touch\" surfaces often (doorknobs, counters, handles). Use household cleaning spray or wipes.  You'll find a full list of  on the EPA website: " www.epa.gov/pesticide-registration/list-n-disinfectants-use-against-sars-cov-2.  Cover your mouth and nose with a mask or other face covering to avoid spreading germs.  Wash your hands and face often. Use soap and water.  Caregivers in these groups are at risk for severe illness due to COVID-19:  People 65 years and older  People who live in a nursing home or long-term care facility  People with chronic disease (lung, heart, cancer, diabetes, kidney, liver, immunologic)  People who have a weakened immune system, including those who:  Are in cancer treatment  Take medicine that weakens the immune system, such as corticosteroids  Had a bone marrow or organ transplant  Have an immune deficiency  Have poorly controlled HIV or AIDS  Are obese (body mass index of 40 or higher)  Smoke regularly  Caregivers should wear gloves while washing dishes, handling laundry and cleaning bedrooms and bathrooms.  Use caution when washing and drying laundry: Don't shake dirty laundry and use the warmest water setting that you can.  For more tips on managing your health at home, go to www.cdc.gov/coronavirus/2019-ncov/downloads/10Things.pdf.  How can I take care of myself at home?  Get lots of rest. Drink extra fluids (unless a doctor has told you not to).  Take Tylenol (acetaminophen) for fever or pain. If you have liver or kidney problems, ask your family doctor if it's okay to take Tylenol.   Adults can take either:   650 mg (two 325 mg pills) every 4 to 6 hours, or   1,000 mg (two 500 mg pills) every 8 hours as needed.  Note: Don't take more than 3,000 mg in one day. Acetaminophen is found in many medicines (both prescribed and over-the-counter medicines). Read all labels to be sure you don't take too much.   For children, check the Tylenol bottle for the right dose. The dose is based on the child's age or weight.  If you have other health problems (like cancer, heart failure, an organ transplant or severe kidney disease): Call your  specialty clinic if you don't feel better in the next 2 days.  Know when to call 911. Emergency warning signs include:  Trouble breathing or shortness of breath  Pain or pressure in the chest that doesn't go away  Feeling confused like you haven't felt before, or not being able to wake up  Bluish-colored lips or face  Your doctor may have prescribed a blood thinner medicine. Follow their instructions.  Where can I get more information?  [facility] - About COVID-19:   [email or web address]  CDC - What to Do If You're Sick: www.cdc.gov/coronavirus/2019-ncov/about/steps-when-sick.html  CDC - Ending Home Isolation: www.cdc.gov/coronavirus/2019-ncov/hcp/disposition-in-home-patients.html  CDC - Caring for Someone: www.cdc.gov/coronavirus/2019-ncov/if-you-are-sick/care-for-someone.html  [facility] - Interim Guidance for Hospital Discharge to Home: www.[facility].[place]./diseases/coronavirus/hcp/hospdischarge.pdf  Below are the COVID-19 hotlines at the [facility] ([facility]). Interpreters are available.  For health questions: Call 838-289-3716 or 1-791.882.5643 (7 a.m. to 7 p.m.)  For questions about schools and childcare: Call 978-696-8663 or 1-634.319.6428 (7 a.m. to 7 p.m.)    For informational purposes only. Not to replace the advice of your health care provider. Clinically reviewed by Dr. James Shook.   Copyright   2020 [employer]. All rights reserved. [employer] 123055 - REV 01/05/21.        ---------------------------------------------------------------------------------------------------  Low Cost Dental Clinics    [facility]  (Chiara CROOK)  [address]  [place]  [address] [phone]  Website    [facility]  [address] [place], [place] [address] [phone]  Website    [facility] (Chiara CROOK)  [address]  [place] [address]  Main: [phone]  Appointments:  [phone]  Website    [facility] (Accepts MA) Pediatric and special needs services, IV sedation, general anesthesia for pediatrics, and conscious sedation for pediatrics  [address] [address]  Appointments: [phone]  Website    [facility]  [address] [place]  [address]  [phone]  Website    [facility] (Dental cleaning,  deep cleaning- periodontal therapy  and x-rays)  [address] [place] [address]  [phone]    [facility] (Multiple locations -- call for details)  [address] [place] [address]  [phone]  Website    [facility] (Accepts MA)  [address]  [place], [place] [address] [phone]  Website    [facility] (Accepts [place])  [address]  [place] [address]  [phone]  Website    [facility] (Accepts MA)  [address]  [place]  [address]  [phone]  Website    [facility] (Accepts MA)  [address] [place]  [address]  [phone]  Website    [facility]  [address] [place] [address]  [phone]  Website    [facility] [place]  [address] [place] [address]  [phone]  Website    [facility] [place]  [address] [place] [address]  [phone]  Website    [facility]  [address] [place] [address]  [phone]  Website    [facility]  [address]  [place], [place] [address] [phone]  Website    [facility]  [address] [place] [address]  [phone]  Website    [facility]  Dr. Osmel Hooper  [address]  [place] [address]  [phone]  Website    [facility]  [address] [place] [address]  [phone]  Website    [facility]   ACCEPTING [place] - NEW  PATIENTS/NO CAP  [address]  [place] [address]  [phone]  Website    [facility] [place]  ACCEPTING MA - NEW PATIENTS ARE CAPPED (PLEASE CALL)  [address]  [place] [address]  [phone]  Website    [facility] [place]  ACCEPTING HP MA ONLY - NEW PATIENTS ARE CAPPED (PLEASE CALL)  [address] [place] [address]  [phone]  Website    [facility]  NO MA/OUT OF NETWORK  [address] [place] [address]  [phone]  Website    [facility] [place]  ACCEPTING MA - NEW PATIENTS/NO CAP  [address] [place] [address]  [phone]  Website    [facility] [place]  ACCEPTING [place] - NEW PATIENTS/NO CAP  [address]  [facility] [address]  [phone]  Website    [facility] [place] ([place]  ACCEPTING MA - NEW PATIENTS/NO CAP  [address]  [facility] [address]  [phone]  Website    [facility] [place]  ACCEPTING MA - NEW PATIENTS ARE CAPPED (PLEASE CALL)  [address]  [place], [place] [address] [phone]  Website    [facility] [place]  NO MA/OUT OF NETWORK  [address]  [place] [address]  [phone]  Website    [facility] [place]  NO MA/OUT OF NETWORK  [address]  [place], [place] [id number] [phone]  Website    [facility]  [address] [place] [address]  [phone]  Website    [facility] (Dental cleaning,  deep cleaning- periodontal therapy  and x-rays)  [address]  [place], [place] [address] [phone]    [facility]  [address] [place], [place] [id number] [phone]  Website    [facility] (open to everyone)  [address]  [place], [place] [address] [phone]  Website    [facility] (Sliding fee scale dental services and some state medical assistance programs accepted)  [address] [place] [address] [address]  [place]  [address]  [phone]    [facility]  [address]  [place] [address]  [phone]  Website    [facility] (Accepts MA)  [address] [place] [address]  [phone]  Website    [facility]  [address] [place] [address]  [phone]  E-mail: [email or web address]  Website     [facility] (Sliding fee scale dental services for all)  [address] [place] [address]  [phone]  Website    [facility] (Dental cleaning,  deep cleaning- periodontal therapy  and x-rays).  [address] [place], [place] [address] [phone]  Website    [facility]  [address] [place] [address]  [phone]  Website    [facility]  [address] [place]  [address]  [phone]  Website    [facility]  [address] [place] [address]  [phone]  [facility]  [address] [place] [address]  [phone]  Website    [facility]  Dental clinic for children with special needs. Accepts MA (must have diagnosed medical condition, i.e.:  autism, CP, chronic disease or condition)  [address]  [place]  [address]  [phone]  Website    [facility]  [address] [place] [address]  [phone]  Website    [facility]  [address] [place] [address]  [phone]  Website    [facility]  [address]  [place], [place]  [address] [phone]  Website    [facility]  Discount plan available.  Call for details.  [address] [place] [address]  [phone]    [facility] (Accepts MA and offers sliding fee scale dental services)  Bryan MEDELLIN  [facility]  [address] [address]  [phone]    [facility] (Dr. Juni Cornejo - specific criteria and medical necessity required)  [facility]nd [address]  [address] [place] [address]  [phone]    [facility]  [address] [place] [address]  [phone] (main clinic)  Website  After Hours: Adult emergencies [phone] Pediatric emergencies [phone]     Dental Center  [address] [place] [address]  [phone]  Website    [facility]  [address] [place] [address]  [phone]  Website

## 2021-12-18 NOTE — ED PROVIDER NOTES
EMERGENCY DEPARTMENT ENCOUNTER      NAME: Bryan Grant  AGE: 31 year old male  YOB: 1990  MRN: [id number]  EVALUATION DATE & TIME: 2021  6:40 PM    PCP: Stacie Alvarez (Inactive)    ED PROVIDER: HALLEY Ramos, CNP      Chief Complaint   Patient presents with     Dental Pain         FINAL IMPRESSION:  1. Dental abscess    2. Infection due to 2019 novel coronavirus    3. Hyperglycemia due to diabetes mellitus (H)    4. Pulmonary nodules          ED COURSE & MEDICAL DECISION MAKIN:50 PM I met with the patient, obtained history, performed an initial exam, and discussed options and plan for treatment here in the ED.  7:42 PM updated by RN that patient refusing additional testing. Discussed concerns with the patient. He was reluctant to have IV contrast.  After further discussion agrees to additional testing including CT and blood cultures  10:45 PM discussed results and plan for discharge.     Pertinent Labs & Imaging studies reviewed. (See chart for details)  31 year old male presents to the Emergency Department for evaluation of facial swelling. CT of the face shows abcess and cellulitis. No airway issues. He was quite tachycardic on arrival. No fever. No leukocytosis. His HGB was elevated. HR improved with IV fluids. Initial lactate 3.4. suspect significant dehydration due to his hyperglycemia causing tachycardia and elevated lactate. Considered but lower suspicion that this was related to sepsis. Blood cultures were ordered and he was given IV zosyn though he was reluctant to receive IV abx. CT chest to evaluate for PE was negative for PE. No other acute process. Nodules noted and discussed with the patient. Will treat dental infection with augmentin. Though hyperglycemia, no evidence for DKA. Patient insistent that he go home this evening. Advised to present to [facility] if his dental infection worsens. Given dental resources.     At the conclusion of the encounter I  discussed the results of all of the tests and the disposition. The questions were answered. The patient or family acknowledged understanding and was agreeable with the care plan.         MEDICATIONS GIVEN IN THE EMERGENCY:  Medications   lactated ringers BOLUS 1,000 mL (1,000 mLs Intravenous Not Given 12/17/21 2050)   0.9% sodium chloride BOLUS (0 mLs Intravenous Stopped 12/17/21 2052)   ketorolac (TORADOL) injection 15 mg (15 mg Intravenous Given 12/17/21 1805)   piperacillin-tazobactam (ZOSYN) 3.375 g vial to attach to  mL bag (0 g Intravenous Stopped 12/17/21 2212)   0.9% sodium chloride BOLUS (0 mLs Intravenous Stopped 12/17/21 2227)   iopamidol (ISOVUE-370) solution 100 mL (100 mLs Intravenous Given 12/17/21 2150)       NEW PRESCRIPTIONS STARTED AT TODAY'S ER VISIT  New Prescriptions    AMOXICILLIN-CLAVULANATE (AUGMENTIN) 875-125 MG TABLET    Take 1 tablet by mouth 2 times daily for 10 days            =================================================================    HPI    Patient information was obtained from: patient    Use of Intrepreter: N/A        Bryan Grant is a 31 year old male with a history of hypertension, type 2 diabetes, hyperlipidemia, depression who presents for evaluation of facial swelling.  Patient noted left-sided facial swelling today.  Admits to poor dentition but denies drug use such as methamphetamine.  Is hoping to have all of his teeth pulled out.  Has been having moderate pain and using ibuprofen and Tylenol with adequate relief.  Though he is a type II diabetic, he does not check blood sugars at home.  Notes that he did test positive yesterday for Covid.  Unvaccinated.  Endorses associated cough and body aches.  Today noted brief, left-sided chest pain. Did vomiting one time today. Denies any associated shortness of breath or fevers.  No difficulty swallowing.  Denies other concerns      REVIEW OF SYSTEMS   Review of Systems   Constitutional: Negative for fever.    HENT: Positive for dental problem and facial swelling.    Respiratory: Positive for cough. Negative for shortness of breath.    Cardiovascular: Positive for chest pain.   Gastrointestinal: Positive for vomiting (x1).   Genitourinary: Negative for decreased urine volume.   Musculoskeletal: Positive for myalgias.   All other systems reviewed and are negative.      PAST MEDICAL HISTORY:  Past Medical History:   Diagnosis Date     Essential hypertension 2/6/2017    Overview:  Hypertension (HTN) NOS Overview:  Overview:  Hypertension (HTN) NOS      Hyperlipidemia 2/27/2017    Overview:  Previously declined intervention. Overview:  Overview:  Previously declined intervention.      Recurrent major depressive disorder (H) 11/14/2017    Overview:  Generalized anxiety disorder, panic disorder, Major depressive disorder,  recurrent, moderate, Rule out personality disorder per Psych. Zoloft 100  mg 1 tablet daily and Ativan 0.5 mg 1 tablet up to 2 times a day as needed  for episodes of severe anxiety or panic attacks prescribed by psych.   Continue to follow with psych/Behavioral Health.    Overview:  Overview:  Generalized anxiety      Shortness of breath 3/4/2015     Tobacco dependence syndrome 2/6/2017     Type 2 diabetes mellitus (H) 2/6/2017    Overview:  Diabetes Mellitus Type 2 Overview:  Overview:  Diabetes Mellitus Type 2        PAST SURGICAL HISTORY:  Past Surgical History:   Procedure Laterality Date     OTHER SURGICAL HISTORY      arm surgery           CURRENT MEDICATIONS:    Prior to Admission Medications   Prescriptions Last Dose Informant Patient Reported? Taking?   PARoxetine (PAXIL) 20 MG tablet   No No   Sig: [PAROXETINE (PAXIL) 20 MG TABLET] Take 1 tablet (20 mg total) by mouth daily.   albuterol (PROAIR HFA;PROVENTIL HFA;VENTOLIN HFA) 90 mcg/actuation inhaler   No No   Sig: [ALBUTEROL (PROAIR HFA;PROVENTIL HFA;VENTOLIN HFA) 90 MCG/ACTUATION INHALER] Inhale 2 puffs every 6 (six) hours as needed.    hydroCHLOROthiazide (HYDRODIURIL) 25 MG tablet   Yes No   Sig: [HYDROCHLOROTHIAZIDE (HYDRODIURIL) 25 MG TABLET] TAKE 1 TABLET PO DAILY   ibuprofen (ADVIL,MOTRIN) 600 MG tablet   No No   Sig: [IBUPROFEN (ADVIL,MOTRIN) 600 MG TABLET] Take 1 tablet (600 mg total) by mouth every 6 (six) hours as needed for pain.   lisinopril-hydrochlorothiazide (PRINZIDE,ZESTORETIC) 10-12.5 mg per tablet   No No   Sig: [LISINOPRIL-HYDROCHLOROTHIAZIDE (PRINZIDE,ZESTORETIC) 10-12.5 MG PER TABLET] Take 1 tablet by mouth daily.   metFORMIN (GLUCOPHAGE) 1000 MG tablet   No No   Sig: [METFORMIN (GLUCOPHAGE) 1000 MG TABLET] Take 0.5 tablets (500 mg total) by mouth 2 (two) times a day with meals.      Facility-Administered Medications: None           ALLERGIES:  Allergies   Allergen Reactions     Erythromycin-Sulfisoxazole Unknown       FAMILY HISTORY:  Family History   Problem Relation Age of Onset     Cerebrovascular Disease Father         just  of massive stroke last week       SOCIAL HISTORY:   Social History     Socioeconomic History     Marital status: Single     Spouse name: Not on file     Number of children: Not on file     Years of education: Not on file     Highest education level: Not on file   Occupational History     Not on file   Tobacco Use     Smoking status: Current Every Day Smoker     Last attempt to quit: 2019     Years since quittin.9     Smokeless tobacco: Current User   Substance and Sexual Activity     Alcohol use: No     Drug use: No     Sexual activity: Not on file   Other Topics Concern     Not on file   Social History Narrative     Not on file     Social Determinants of Health     Financial Resource Strain: Not on file   Food Insecurity: Not on file   Transportation Needs: Not on file   Physical Activity: Not on file   Stress: Not on file   Social Connections: Not on file   Intimate Partner Violence: Not on file   Housing Stability: Not on file         VITALS:  Patient Vitals for the past 24 hrs:   BP  "Temp Temp src Pulse Resp SpO2 Height Weight   12/17/21 2156 -- -- -- 104 16 98 % -- --   12/17/21 1934 (!) 146/90 -- -- 111 24 -- -- --   12/17/21 1740 (!) 137/100 98.8  F (37.1  C) Temporal (!) 133 20 98 % 1.778 m (5' 10\") 95.3 kg (210 lb)       PHYSICAL EXAM    Constitutional:  Alert, no distress  EYES: Conjunctivae clear  HENT:  Mild Left maxillary swelling and overall poor dentition. No discernable dental abscess. No facial erythema. No trismus or stridor. Neck supple. No meningismus.  Respiratory:  No respiratory distress, normal breath sounds  Cardiovascular:  tachycardic, normal rhythm, no murmurs  Musculoskeletal:  No edema.  Integument: Warm, Dry  Neurologic:  Alert & oriented x 3     LAB:  All pertinent labs reviewed and interpreted.  Results for orders placed or performed during the hospital encounter of 12/17/21   CT Facial Bones with Contrast    Impression    IMPRESSION:   1.  Extensive carious erosion involving the dentition of the mandible and maxilla. There is carious erosion of the left central incisor, lateral incisor and canine of the maxilla where there is a prominent periapical lucency involving the roots. Small   loculated subperiosteal abscesses and soft tissue abscesses are noted along the left anterior maxilla in this region. There is extensive surrounding soft tissue thickening and inflammatory change, likely reflecting cellulitis.  2.  Carious erosion of the left first premolar of the maxilla with a prominent associated periapical lucency and cortical breakthrough of the outer table of the maxilla. There is a small amount of phlegmonous change noted along the anterior maxilla in   this region.  3.  Extensive left facial soft tissue cellulitis extending along the left mandible, upper lip, left nasolabial fold, left premalar soft tissues and left inferior preseptal soft tissues.  4.  Additional findings above.   CT Chest Pulmonary Embolism w Contrast    Impression    IMPRESSION:  1.  No " pulmonary embolus, aortic aneurysm or dissection. No acute infiltrate.  2.  4 mm and smaller pulmonary nodules likely benign. If no prior see guidelines below.  3.  Enlarged main pulmonary artery.    REFERENCE:  Guidelines for Management of Incidental Pulmonary Nodules Detected on CT Images: From the Fleischner Society 2017.   Guidelines apply to incidental nodules in patients who are 35 years or older.  Guidelines do not apply to lung cancer screening, patients with immunosuppression, or patients with known primary cancer.    MULTIPLE NODULES  Nodule size <6 mm  Low-risk patients: No follow-up needed.  High-risk patients: Optional follow-up at 12 months.    Nodule size 6 mm or larger  Low-risk patients: Follow-up CT at 3-6 months, then consider CT at 18-24 months.  High-risk patients: Follow-up CT at 3-6 months, then at 18-24 months if no change.  -Use most suspicious nodule as guide to management.    Consider referral to lung nodule clinic.     Comprehensive metabolic panel   Result Value Ref Range    Sodium 132 (L) 136 - 145 mmol/L    Potassium 4.5 3.5 - 5.0 mmol/L    Chloride 94 (L) 98 - 107 mmol/L    Carbon Dioxide (CO2) 22 22 - 31 mmol/L    Anion Gap 16 5 - 18 mmol/L    Urea Nitrogen 14 8 - 22 mg/dL    Creatinine 1.14 0.70 - 1.30 mg/dL    Calcium 9.8 8.5 - 10.5 mg/dL    Glucose 456 (HH) 70 - 125 mg/dL    Alkaline Phosphatase 83 45 - 120 U/L    AST 18 0 - 40 U/L    ALT 29 0 - 45 U/L    Protein Total 8.1 (H) 6.0 - 8.0 g/dL    Albumin 4.2 3.5 - 5.0 g/dL    Bilirubin Total 0.5 0.0 - 1.0 mg/dL    GFR Estimate 86 >60 mL/min/1.73m2   Lactic acid whole blood   Result Value Ref Range    Lactic Acid 3.4 (H) 0.7 - 2.0 mmol/L   Blood gas venous   Result Value Ref Range    pH Venous 7.43 7.35 - 7.45    pCO2 Venous 37 35 - 50 mm Hg    pO2 Venous 49 (H) 25 - 47 mm Hg    Bicarbonate Venous 25 24 - 30 mmol/L    Base Excess/Deficit (+/-) 0.5   mmol/L    Oxyhemoglobin Venous 84.3 (H) 70.0 - 75.0 %    O2 Sat, Venous 86.6 (H) 70.0  - 75.0 %   Ketone Beta-Hydroxybutyrate Quantitative   Result Value Ref Range    Ketone (Beta-Hydroxybutyrate) Quantitative 0.11 <=0.3 mmol/L   CBC with platelets and differential   Result Value Ref Range    WBC Count 7.5 4.0 - 11.0 10e3/uL    RBC Count 5.93 (H) 4.40 - 5.90 10e6/uL    Hemoglobin 17.8 (H) 13.3 - 17.7 g/dL    Hematocrit 50.9 40.0 - 53.0 %    MCV 86 78 - 100 fL    MCH 30.0 26.5 - 33.0 pg    MCHC 35.0 31.5 - 36.5 g/dL    RDW 11.6 10.0 - 15.0 %    Platelet Count 240 150 - 450 10e3/uL    % Neutrophils 60 %    % Lymphocytes 22 %    % Monocytes 18 %    % Eosinophils 0 %    % Basophils 0 %    % Immature Granulocytes 0 %    NRBCs per 100 WBC 0 <1 /100    Absolute Neutrophils 4.4 1.6 - 8.3 10e3/uL    Absolute Lymphocytes 1.7 0.8 - 5.3 10e3/uL    Absolute Monocytes 1.3 0.0 - 1.3 10e3/uL    Absolute Eosinophils 0.0 0.0 - 0.7 10e3/uL    Absolute Basophils 0.0 0.0 - 0.2 10e3/uL    Absolute Immature Granulocytes 0.0 <=0.4 10e3/uL    Absolute NRBCs 0.0 10e3/uL   Troponin I (now)   Result Value Ref Range    Troponin I 0.03 0.00 - 0.29 ng/mL   Lactic acid whole blood   Result Value Ref Range    Lactic Acid 2.0 0.7 - 2.0 mmol/L   ECG 12-LEAD WITH MUSE (L[facility])   Result Value Ref Range    Systolic Blood Pressure  mmHg    Diastolic Blood Pressure  mmHg    Ventricular Rate 112 BPM    Atrial Rate 112 BPM    AK Interval 132 ms    QRS Duration 72 ms     ms    QTc 425 ms    P Axis 40 degrees    R AXIS 91 degrees    T Axis 29 degrees    Interpretation ECG       Sinus tachycardia  Rightward axis  Borderline ECG  When compared with ECG of 24-JUL-2019 18:06,  No significant change was found  Confirmed by SEE ED PROVIDER NOTE FOR, ECG INTERPRETATION ([id number]),  YORDAN DUQUE ([id number]) on 12/17/2021 10:40:21 PM         RADIOLOGY:  Reviewed all pertinent imaging. Please see official radiology report.  CT Facial Bones with Contrast   Preliminary Result   IMPRESSION:    1.  Extensive carious erosion involving the dentition of the  mandible and maxilla. There is carious erosion of the left central incisor, lateral incisor and canine of the maxilla where there is a prominent periapical lucency involving the roots. Small    loculated subperiosteal abscesses and soft tissue abscesses are noted along the left anterior maxilla in this region. There is extensive surrounding soft tissue thickening and inflammatory change, likely reflecting cellulitis.   2.  Carious erosion of the left first premolar of the maxilla with a prominent associated periapical lucency and cortical breakthrough of the outer table of the maxilla. There is a small amount of phlegmonous change noted along the anterior maxilla in    this region.   3.  Extensive left facial soft tissue cellulitis extending along the left mandible, upper lip, left nasolabial fold, left premalar soft tissues and left inferior preseptal soft tissues.   4.  Additional findings above.      CT Chest Pulmonary Embolism w Contrast   Final Result   IMPRESSION:   1.  No pulmonary embolus, aortic aneurysm or dissection. No acute infiltrate.   2.  4 mm and smaller pulmonary nodules likely benign. If no prior see guidelines below.   3.  Enlarged main pulmonary artery.      REFERENCE:   Guidelines for Management of Incidental Pulmonary Nodules Detected on CT Images: From the Fleischner Society 2017.    Guidelines apply to incidental nodules in patients who are 35 years or older.   Guidelines do not apply to lung cancer screening, patients with immunosuppression, or patients with known primary cancer.      MULTIPLE NODULES   Nodule size <6 mm   Low-risk patients: No follow-up needed.   High-risk patients: Optional follow-up at 12 months.      Nodule size 6 mm or larger   Low-risk patients: Follow-up CT at 3-6 months, then consider CT at 18-24 months.   High-risk patients: Follow-up CT at 3-6 months, then at 18-24 months if no change.   -Use most suspicious nodule as guide to management.      Consider referral to  lung nodule clinic.           EKG Interpretation  12/17/2021 at 7:33 PM    Rhythm: Sinus tachycardia  Rate: 112 bpm  Axis: Rightward axis  Ectopy: none  Conduction: normal  ST Segments: no acute change  T Waves: no acute change  Q Waves: none    Clinical Impression: Sinus tachycardia with rightward axis.  Compared to prior EKG from 7/24/2029, tachycardia and rightward axis appear new.  No other significant change.    I have independentlyreviewed and interpreted the patient's EKG with comments made as listed above.  Please see scanned image for full interpretation      PROCEDURES:   None    HALLEY Ramos, CNP  Emergency Medicine  [facility] EMERGENCY DEPARTMENT  [facility] [facility] [address] [phone]  Dept: [phone]         David White APRN CNP  12/17/21 2025

## 2021-12-23 LAB
BACTERIA BLD CULT: NO GROWTH
BACTERIA BLD CULT: NO GROWTH

## 2022-04-19 ENCOUNTER — OFFICE VISIT (OUTPATIENT)
Dept: FAMILY MEDICINE | Facility: CLINIC | Age: 32
End: 2022-04-19
Payer: COMMERCIAL

## 2022-04-19 VITALS
WEIGHT: 233 LBS | SYSTOLIC BLOOD PRESSURE: 130 MMHG | TEMPERATURE: 99.5 F | BODY MASS INDEX: 33.43 KG/M2 | OXYGEN SATURATION: 96 % | RESPIRATION RATE: 16 BRPM | HEART RATE: 109 BPM | DIASTOLIC BLOOD PRESSURE: 76 MMHG

## 2022-04-19 DIAGNOSIS — B07.0 PLANTAR WARTS: ICD-10-CM

## 2022-04-19 DIAGNOSIS — E11.9 TYPE 2 DIABETES MELLITUS WITHOUT COMPLICATION, WITH LONG-TERM CURRENT USE OF INSULIN (H): Primary | ICD-10-CM

## 2022-04-19 DIAGNOSIS — Z79.4 TYPE 2 DIABETES MELLITUS WITHOUT COMPLICATION, WITH LONG-TERM CURRENT USE OF INSULIN (H): Primary | ICD-10-CM

## 2022-04-19 LAB
ERYTHROCYTE [DISTWIDTH] IN BLOOD BY AUTOMATED COUNT: 11.5 % (ref 10–15)
HBA1C MFR BLD: 10.8 % (ref 0–5.6)
HCT VFR BLD AUTO: 45.2 % (ref 40–53)
HGB BLD-MCNC: 16 G/DL (ref 13.3–17.7)
MCH RBC QN AUTO: 30.2 PG (ref 26.5–33)
MCHC RBC AUTO-ENTMCNC: 35.4 G/DL (ref 31.5–36.5)
MCV RBC AUTO: 85 FL (ref 78–100)
PLATELET # BLD AUTO: 272 10E3/UL (ref 150–450)
RBC # BLD AUTO: 5.29 10E6/UL (ref 4.4–5.9)
WBC # BLD AUTO: 7.1 10E3/UL (ref 4–11)

## 2022-04-19 PROCEDURE — 80053 COMPREHEN METABOLIC PANEL: CPT | Performed by: PHYSICIAN ASSISTANT

## 2022-04-19 PROCEDURE — 36415 COLL VENOUS BLD VENIPUNCTURE: CPT | Performed by: PHYSICIAN ASSISTANT

## 2022-04-19 PROCEDURE — 99214 OFFICE O/P EST MOD 30 MIN: CPT | Performed by: PHYSICIAN ASSISTANT

## 2022-04-19 PROCEDURE — 85027 COMPLETE CBC AUTOMATED: CPT | Performed by: PHYSICIAN ASSISTANT

## 2022-04-19 PROCEDURE — 83036 HEMOGLOBIN GLYCOSYLATED A1C: CPT | Performed by: PHYSICIAN ASSISTANT

## 2022-04-19 RX ORDER — LANCETS
EACH MISCELLANEOUS
Qty: 1 EACH | Refills: 3 | Status: SHIPPED | OUTPATIENT
Start: 2022-04-19 | End: 2022-08-02

## 2022-04-19 RX ORDER — GLUCOSAMINE HCL/CHONDROITIN SU 500-400 MG
CAPSULE ORAL
Qty: 100 EACH | Refills: 3 | Status: SHIPPED | OUTPATIENT
Start: 2022-04-19 | End: 2022-08-02

## 2022-04-19 ASSESSMENT — ENCOUNTER SYMPTOMS
FATIGUE: 1
SLEEP DISTURBANCE: 0
DYSURIA: 0
NERVOUS/ANXIOUS: 1
POLYDIPSIA: 1
HEMATURIA: 0

## 2022-04-20 ENCOUNTER — TELEPHONE (OUTPATIENT)
Dept: FAMILY MEDICINE | Facility: CLINIC | Age: 32
End: 2022-04-20
Payer: COMMERCIAL

## 2022-04-20 LAB
ALBUMIN SERPL-MCNC: 4.3 G/DL (ref 3.5–5)
ALP SERPL-CCNC: 104 U/L (ref 45–120)
ALT SERPL W P-5'-P-CCNC: 38 U/L (ref 0–45)
ANION GAP SERPL CALCULATED.3IONS-SCNC: 20 MMOL/L (ref 5–18)
AST SERPL W P-5'-P-CCNC: 20 U/L (ref 0–40)
BILIRUB SERPL-MCNC: 0.2 MG/DL (ref 0–1)
BUN SERPL-MCNC: 16 MG/DL (ref 8–22)
CALCIUM SERPL-MCNC: 9.9 MG/DL (ref 8.5–10.5)
CHLORIDE BLD-SCNC: 99 MMOL/L (ref 98–107)
CO2 SERPL-SCNC: 19 MMOL/L (ref 22–31)
CREAT SERPL-MCNC: 1.31 MG/DL (ref 0.7–1.3)
GFR SERPL CREATININE-BSD FRML MDRD: 75 ML/MIN/1.73M2
GLUCOSE BLD-MCNC: 424 MG/DL (ref 70–125)
POTASSIUM BLD-SCNC: 4 MMOL/L (ref 3.5–5)
PROT SERPL-MCNC: 8.1 G/DL (ref 6–8)
SODIUM SERPL-SCNC: 138 MMOL/L (ref 136–145)

## 2022-04-20 NOTE — PATIENT INSTRUCTIONS
Begin checking blood sugar daily and keep a log of it if the meter doesn't do it for you.   Begin Metformin daily with breakfast. For first 7 days take 1 pill then increase to two pills. You can either take them together or separate them. Taking them together is more likely to cause GI upset, but  can be harder to remember, so it's really up to you preference.   I will call with your lab results.  Calibration liquid for your meter will need to be called into your pharmacy, but they should have it in a few days.

## 2022-04-20 NOTE — TELEPHONE ENCOUNTER
Called patient and reviewed results.   Blood sugar at 280 this morning.   Just started Metformin once a day 500mg.  Increase to bid as long as tolerating medication.   Has Follow-up scheduled on 4/26/22.   Patient drinks energy drinks. Discussed quitting all sugary drinks.  Lower carb diet with fruits, veggies, lean meats.     Ayala Decker PA-C on 4/20/2022 at 11:04 AM

## 2022-04-20 NOTE — PROGRESS NOTES
Patient presents with:  Diabetes: Diabetes not controlled and pt states blood pressure is probably high pt not feeling well   Foot Problems: On left foot callus or something its painful when stepping a certain way or tight shoes       Clinical Decision Making: Patient experiencing chronic generalized fatigue with uncontrolled diabetes.  Patient restarted on metformin today.  Currently blood pressure is somewhat well controlled.  We will hold off on any blood pressure management today.    Small plantar wart noted on exam.  Recommend over-the-counter Dr. Morris's plantar pads.      ICD-10-CM    1. Type 2 diabetes mellitus without complication, with long-term current use of insulin (H)  E11.9 Hemoglobin A1c    Z79.4 Comprehensive metabolic panel (BMP + Alb, Alk Phos, ALT, AST, Total. Bili, TP)     CBC with platelets     blood glucose monitoring (NO BRAND SPECIFIED) meter device kit     metFORMIN (GLUCOPHAGE) 500 MG tablet     Hemoglobin A1c     Comprehensive metabolic panel (BMP + Alb, Alk Phos, ALT, AST, Total. Bili, TP)     CBC with platelets     blood glucose (NO BRAND SPECIFIED) test strip     blood glucose calibration (NO BRAND SPECIFIED) solution     thin (NO BRAND SPECIFIED) lancets     alcohol swab prep pads   2. Plantar warts  B07.0        Patient Instructions   1. Begin checking blood sugar daily and keep a log of it if the meter doesn't do it for you.   2. Begin Metformin daily with breakfast. For first 7 days take 1 pill then increase to two pills. You can either take them together or separate them. Taking them together is more likely to cause GI upset, but  can be harder to remember, so it's really up to you preference.   3. I will call with your lab results.  4. Calibration liquid for your meter will need to be called into your pharmacy, but they should have it in a few days.       HPI:  Bryan Grant is a 32 year old male with past medical history of hypertension and type 2 diabetes who  "presents today complaining of generalized unwell feeling. Patient reports polyuria, fatigue, polydipsia, and agitation. He is sleeping well. Patient also experiencing left foot callus that is painful when he walks a certain way.  Patient admits to not taking any of his medications such as metformin, lisinopril hydrochlorothiazide. Patient plans to establish care soon, but he wanted to \"get things started, which he's waiting to establish\". He is not fasting.     History obtained from the patient.    Problem List:  2017-11: Recurrent major depressive disorder (H)  2017-02: Hyperlipidemia  2017-02: Essential hypertension  2017-02: Type 2 diabetes mellitus (H)  2017-02: Tobacco dependence syndrome  2015-03: Shortness of breath  2015-02: Generalized anxiety disorder      Past Medical History:   Diagnosis Date     Essential hypertension 2/6/2017    Overview:  Hypertension (HTN) NOS Overview:  Overview:  Hypertension (HTN) NOS      Hyperlipidemia 2/27/2017    Overview:  Previously declined intervention. Overview:  Overview:  Previously declined intervention.      Recurrent major depressive disorder (H) 11/14/2017    Overview:  Generalized anxiety disorder, panic disorder, Major depressive disorder,  recurrent, moderate, Rule out personality disorder per Psych. Zoloft 100  mg 1 tablet daily and Ativan 0.5 mg 1 tablet up to 2 times a day as needed  for episodes of severe anxiety or panic attacks prescribed by psych.   Continue to follow with psych/Behavioral Health.    Overview:  Overview:  Generalized anxiety      Shortness of breath 3/4/2015     Tobacco dependence syndrome 2/6/2017     Type 2 diabetes mellitus (H) 2/6/2017    Overview:  Diabetes Mellitus Type 2 Overview:  Overview:  Diabetes Mellitus Type 2        Social History     Tobacco Use     Smoking status: Current Every Day Smoker     Last attempt to quit: 1/1/2019     Years since quitting: 3.2     Smokeless tobacco: Current User   Substance Use Topics     Alcohol " use: No       Review of Systems   Constitutional: Positive for fatigue.   Endocrine: Positive for polydipsia and polyuria.   Genitourinary: Negative for dysuria and hematuria.   Psychiatric/Behavioral: Negative for sleep disturbance. The patient is nervous/anxious.        Vitals:    04/19/22 1907   BP: 130/76   Pulse: 109   Resp: 16   Temp: 99.5  F (37.5  C)   TempSrc: Oral   SpO2: 96%   Weight: 105.7 kg (233 lb)       Physical Exam  Vitals and nursing note reviewed.   Constitutional:       General: He is not in acute distress.     Appearance: He is not toxic-appearing or diaphoretic.   HENT:      Head: Normocephalic and atraumatic.      Right Ear: External ear normal.      Left Ear: External ear normal.   Eyes:      Conjunctiva/sclera: Conjunctivae normal.   Pulmonary:      Effort: Pulmonary effort is normal. No respiratory distress.   Neurological:      Mental Status: He is alert.   Psychiatric:         Mood and Affect: Mood normal.         Behavior: Behavior normal.         Thought Content: Thought content normal.         Judgment: Judgment normal.         Results:  Results for orders placed or performed in visit on 04/19/22   Hemoglobin A1c     Status: Abnormal   Result Value Ref Range    Hemoglobin A1C 10.8 (H) 0.0 - 5.6 %    Narrative    Results rechecked per protocol.   CBC with platelets     Status: Normal   Result Value Ref Range    WBC Count 7.1 4.0 - 11.0 10e3/uL    RBC Count 5.29 4.40 - 5.90 10e6/uL    Hemoglobin 16.0 13.3 - 17.7 g/dL    Hematocrit 45.2 40.0 - 53.0 %    MCV 85 78 - 100 fL    MCH 30.2 26.5 - 33.0 pg    MCHC 35.4 31.5 - 36.5 g/dL    RDW 11.5 10.0 - 15.0 %    Platelet Count 272 150 - 450 10e3/uL         At the end of the encounter, I discussed results, diagnosis, medications. Discussed red flags for immediate return to clinic/ER, as well as indications for follow up if no improvement. Patient understood and agreed to plan. Patient was stable for discharge.

## 2022-04-22 RX ORDER — DICLOFENAC SODIUM 75 MG/1
TABLET, DELAYED RELEASE ORAL
COMMUNITY
Start: 2022-03-30 | End: 2022-06-14

## 2022-04-26 ENCOUNTER — MEDICAL CORRESPONDENCE (OUTPATIENT)
Dept: HEALTH INFORMATION MANAGEMENT | Facility: CLINIC | Age: 32
End: 2022-04-26

## 2022-04-26 ENCOUNTER — OFFICE VISIT (OUTPATIENT)
Dept: FAMILY MEDICINE | Facility: CLINIC | Age: 32
End: 2022-04-26
Payer: COMMERCIAL

## 2022-04-26 VITALS
HEART RATE: 68 BPM | HEIGHT: 70 IN | WEIGHT: 239 LBS | RESPIRATION RATE: 18 BRPM | BODY MASS INDEX: 34.22 KG/M2 | SYSTOLIC BLOOD PRESSURE: 132 MMHG | DIASTOLIC BLOOD PRESSURE: 90 MMHG | TEMPERATURE: 98.4 F

## 2022-04-26 DIAGNOSIS — F33.9 EPISODE OF RECURRENT MAJOR DEPRESSIVE DISORDER, UNSPECIFIED DEPRESSION EPISODE SEVERITY (H): ICD-10-CM

## 2022-04-26 DIAGNOSIS — E66.09 CLASS 1 OBESITY DUE TO EXCESS CALORIES WITHOUT SERIOUS COMORBIDITY WITH BODY MASS INDEX (BMI) OF 33.0 TO 33.9 IN ADULT: ICD-10-CM

## 2022-04-26 DIAGNOSIS — Z11.4 SCREENING FOR HIV (HUMAN IMMUNODEFICIENCY VIRUS): ICD-10-CM

## 2022-04-26 DIAGNOSIS — E11.9 TYPE 2 DIABETES MELLITUS WITHOUT COMPLICATION, WITHOUT LONG-TERM CURRENT USE OF INSULIN (H): ICD-10-CM

## 2022-04-26 DIAGNOSIS — L85.3 DRY SKIN: ICD-10-CM

## 2022-04-26 DIAGNOSIS — Z76.89 ESTABLISHING CARE WITH NEW DOCTOR, ENCOUNTER FOR: Primary | ICD-10-CM

## 2022-04-26 DIAGNOSIS — I10 ESSENTIAL HYPERTENSION: ICD-10-CM

## 2022-04-26 DIAGNOSIS — Z11.59 NEED FOR HEPATITIS C SCREENING TEST: ICD-10-CM

## 2022-04-26 DIAGNOSIS — Z13.220 SCREENING FOR HYPERLIPIDEMIA: ICD-10-CM

## 2022-04-26 DIAGNOSIS — F41.1 GENERALIZED ANXIETY DISORDER: ICD-10-CM

## 2022-04-26 DIAGNOSIS — E66.811 CLASS 1 OBESITY DUE TO EXCESS CALORIES WITHOUT SERIOUS COMORBIDITY WITH BODY MASS INDEX (BMI) OF 33.0 TO 33.9 IN ADULT: ICD-10-CM

## 2022-04-26 LAB
ANION GAP SERPL CALCULATED.3IONS-SCNC: 13 MMOL/L (ref 5–18)
BUN SERPL-MCNC: 12 MG/DL (ref 8–22)
CALCIUM SERPL-MCNC: 9.2 MG/DL (ref 8.5–10.5)
CHLORIDE BLD-SCNC: 106 MMOL/L (ref 98–107)
CHOLEST SERPL-MCNC: 235 MG/DL
CO2 SERPL-SCNC: 22 MMOL/L (ref 22–31)
CREAT SERPL-MCNC: 0.86 MG/DL (ref 0.7–1.3)
CREAT UR-MCNC: 166 MG/DL
FASTING STATUS PATIENT QL REPORTED: ABNORMAL
GFR SERPL CREATININE-BSD FRML MDRD: >90 ML/MIN/1.73M2
GLUCOSE BLD-MCNC: 222 MG/DL (ref 70–125)
HCV AB SERPL QL IA: NONREACTIVE
HDLC SERPL-MCNC: 33 MG/DL
HIV 1+2 AB+HIV1 P24 AG SERPL QL IA: NEGATIVE
LDLC SERPL CALC-MCNC: 128 MG/DL
MICROALBUMIN UR-MCNC: 0.73 MG/DL (ref 0–1.99)
MICROALBUMIN/CREAT UR: 4.4 MG/G CR
POTASSIUM BLD-SCNC: 4.2 MMOL/L (ref 3.5–5)
SODIUM SERPL-SCNC: 141 MMOL/L (ref 136–145)
TRIGL SERPL-MCNC: 369 MG/DL

## 2022-04-26 PROCEDURE — 99214 OFFICE O/P EST MOD 30 MIN: CPT | Performed by: FAMILY MEDICINE

## 2022-04-26 PROCEDURE — 36415 COLL VENOUS BLD VENIPUNCTURE: CPT | Performed by: FAMILY MEDICINE

## 2022-04-26 PROCEDURE — 86803 HEPATITIS C AB TEST: CPT | Performed by: FAMILY MEDICINE

## 2022-04-26 PROCEDURE — 80048 BASIC METABOLIC PNL TOTAL CA: CPT | Performed by: FAMILY MEDICINE

## 2022-04-26 PROCEDURE — 80061 LIPID PANEL: CPT | Performed by: FAMILY MEDICINE

## 2022-04-26 PROCEDURE — 82043 UR ALBUMIN QUANTITATIVE: CPT | Performed by: FAMILY MEDICINE

## 2022-04-26 PROCEDURE — 87389 HIV-1 AG W/HIV-1&-2 AB AG IA: CPT | Performed by: FAMILY MEDICINE

## 2022-04-26 RX ORDER — HYDROXYZINE PAMOATE 25 MG/1
25 CAPSULE ORAL 3 TIMES DAILY PRN
Qty: 30 CAPSULE | Refills: 0 | Status: SHIPPED | OUTPATIENT
Start: 2022-04-26 | End: 2022-08-02

## 2022-04-26 RX ORDER — LISINOPRIL 5 MG/1
5 TABLET ORAL DAILY
Qty: 90 TABLET | Refills: 1 | Status: SHIPPED | OUTPATIENT
Start: 2022-04-26 | End: 2022-09-25

## 2022-04-26 ASSESSMENT — PATIENT HEALTH QUESTIONNAIRE - PHQ9: SUM OF ALL RESPONSES TO PHQ QUESTIONS 1-9: 4

## 2022-04-26 NOTE — PROGRESS NOTES
Bryan was seen today for physical.    Diagnoses and all orders for this visit:    Establishing care with new doctor, encounter for: Medication, allergies and medications reviewed and updated in the chart.    Type 2 diabetes mellitus without complication, without long-term current use of insulin (H): Patient has been off his diabetes medications for few years.  Just recently went to urgent care to get restarted on his metformin.  A1c was drawn at that time and was 10.8.  Other labs will be done today as below.  He reports intermittent blurry vision and discussed importance of yearly eye exams.  In addition his blood pressure remained borderline and I have started him on lisinopril and we discussed how this can help to protect his kidneys with the diabetes as well.  Right now he is taking 1000 mg metformin in the morning.  We discussed slowly increasing this up to 2000 mg a day.  He will start taking 500 mg at night and increase to thousand twice a day in a couple weeks.  He wishes to have some tooth extractions done but he will need to get his diabetes under control before that is able to happen.  We will plan to bring him back in a couple months and recheck that A1c and see how things are doing.  -     Lipid panel reflex to direct LDL Fasting; Future  -     Albumin Random Urine Quantitative with Creat Ratio; Future  -     OPTOMETRY REFERRAL; Future  -     Basic metabolic panel  (Ca, Cl, CO2, Creat, Gluc, K, Na, BUN); Future  -     lisinopril (ZESTRIL) 5 MG tablet; Take 1 tablet (5 mg) by mouth daily    Essential hypertension: Recheck remained borderline at 132/90.  History of hypertension.  We will start him on just 5 mg of lisinopril.  -     lisinopril (ZESTRIL) 5 MG tablet; Take 1 tablet (5 mg) by mouth daily  -     Basic metabolic panel  (Ca, Cl, CO2, Creat, Gluc, K, Na, BUN)    Screening for hyperlipidemia  -     Lipid panel reflex to direct LDL Fasting; Future  -     Lipid panel reflex to direct LDL  Fasting    Generalized anxiety disorder: He reports more anxiety recently feeling more panic attacks.  Will trial Vistaril 25 mg 3 times a day as needed.  He is a  and discussed taking a few doses at home first to see if it makes him sleepy.  Cannot take if it makes him sleepy.  -     hydrOXYzine (VISTARIL) 25 MG capsule; Take 1 capsule (25 mg) by mouth 3 times daily as needed for anxiety    Episode of recurrent major depressive disorder, unspecified depression episode severity (H)    Dry skin: Rough dry skin on his hands.  Callus from work.  He was requesting a lotion to try.  -     ammonium lactate (LAC-HYDRIN) 12 % external lotion; Apply topically 2 times daily    Screening for HIV (human immunodeficiency virus): Agreeable  -     HIV Antigen Antibody Combo; Future  -     HIV Antigen Antibody Combo    Need for hepatitis C screening test: Agreeable  -     Hepatitis C Screen Reflex to HCV RNA Quant and Genotype; Future  -     Hepatitis C Screen Reflex to HCV RNA Quant and Genotype    Other orders  -     REVIEW OF HEALTH MAINTENANCE PROTOCOL ORDERS        Jaydon Adams is a 32 year old who presents for the following health issues     Healthy Habits:     Taking medications regularly:  1    PHQ-2 Total Score: 1  History of Present Illness       Diabetes:   He presents for follow up of diabetes.  He is checking home blood glucose two times daily. He checks blood glucose before and after meals.  Blood glucose is sometimes over 200 and never under 70. When his blood glucose is low, the patient is asymptomatic for confusion, blurred vision, lethargy and reports not feeling dizzy, shaky, or weak.  He is concerned about blood sugar frequently over 200.  He is having numbness in feet. The patient has not had a diabetic eye exam in the last 12 months.         Hypertension: He presents for follow up of hypertension.  He does not check blood pressure  regularly outside of the clinic. Outside blood pressures  "have been over 140/90. He does not follow a low salt diet.     He eats 0-1 servings of fruits and vegetables daily.He consumes 3 sweetened beverage(s) daily.He exercises with enough effort to increase his heart rate 30 to 60 minutes per day.  He exercises with enough effort to increase his heart rate 6 days per week. He is missing 1 dose(s) of medications per week.     Had been off for 3 years.   1,000 morning.   Anxiety - last few days.   Like knots in my throat.   Gave him xanax in the past which he thought was unusual.  He does not want to get addicted to it.  He has not heard great things about it and thought it was too much treatment.  Vision issues with pills?  Or just the diabetes.  Needs new glasses.    Review of Systems   Constitutional, HEENT, cardiovascular, pulmonary, gi and gu systems are negative, except as otherwise noted.      Objective    BP (!) 132/90   Pulse 68   Temp 98.4  F (36.9  C) (Temporal)   Resp 18   Ht 1.79 m (5' 10.47\")   Wt 108.4 kg (239 lb)   BMI 33.84 kg/m    Body mass index is 33.84 kg/m .  Physical Exam   GENERAL: healthy, alert, no distress and obese  RESP: lungs clear to auscultation - no rales, rhonchi or wheezes  CV: regular rate and rhythm, normal S1 S2, no S3 or S4, no murmur, click or rub, no peripheral edema   MS: no gross musculoskeletal defects noted, no edema  Skin: Hands are rough, callused and sustained due to his work.                "

## 2022-04-27 PROBLEM — E66.811 CLASS 1 OBESITY DUE TO EXCESS CALORIES WITHOUT SERIOUS COMORBIDITY WITH BODY MASS INDEX (BMI) OF 33.0 TO 33.9 IN ADULT: Status: ACTIVE | Noted: 2022-04-27

## 2022-04-27 PROBLEM — E66.09 CLASS 1 OBESITY DUE TO EXCESS CALORIES WITHOUT SERIOUS COMORBIDITY WITH BODY MASS INDEX (BMI) OF 33.0 TO 33.9 IN ADULT: Status: ACTIVE | Noted: 2022-04-27

## 2022-04-27 PROBLEM — L85.3 DRY SKIN: Status: ACTIVE | Noted: 2022-04-27

## 2022-04-27 RX ORDER — AMMONIUM LACTATE 12 G/100G
LOTION TOPICAL 2 TIMES DAILY
Qty: 222 ML | Refills: 1 | Status: SHIPPED | OUTPATIENT
Start: 2022-04-27

## 2022-05-22 ENCOUNTER — HEALTH MAINTENANCE LETTER (OUTPATIENT)
Age: 32
End: 2022-05-22

## 2022-06-03 ENCOUNTER — MYC MEDICAL ADVICE (OUTPATIENT)
Dept: FAMILY MEDICINE | Facility: CLINIC | Age: 32
End: 2022-06-03
Payer: COMMERCIAL

## 2022-06-04 ENCOUNTER — OFFICE VISIT (OUTPATIENT)
Dept: FAMILY MEDICINE | Facility: CLINIC | Age: 32
End: 2022-06-04
Payer: COMMERCIAL

## 2022-06-04 VITALS
HEART RATE: 112 BPM | DIASTOLIC BLOOD PRESSURE: 89 MMHG | OXYGEN SATURATION: 98 % | SYSTOLIC BLOOD PRESSURE: 134 MMHG | BODY MASS INDEX: 32.43 KG/M2 | WEIGHT: 229.1 LBS | RESPIRATION RATE: 16 BRPM | TEMPERATURE: 99.7 F

## 2022-06-04 DIAGNOSIS — D69.6 THROMBOCYTOPENIA (H): ICD-10-CM
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
## 2022-06-04 DIAGNOSIS — S30.861A TICK BITE OF ABDOMINAL WALL, INITIAL ENCOUNTER: ICD-10-CM

## 2022-06-04 DIAGNOSIS — R50.9 FEVER, UNSPECIFIED FEVER CAUSE: Primary | ICD-10-CM

## 2022-06-04 LAB
ALBUMIN SERPL-MCNC: 3.8 G/DL (ref 3.5–5)
ALP SERPL-CCNC: 61 U/L (ref 45–120)
ALT SERPL W P-5'-P-CCNC: 59 U/L (ref 0–45)
ANION GAP SERPL CALCULATED.3IONS-SCNC: 12 MMOL/L (ref 5–18)
AST SERPL W P-5'-P-CCNC: 31 U/L (ref 0–40)
BASOPHILS # BLD AUTO: 0 10E3/UL (ref 0–0.2)
BASOPHILS NFR BLD AUTO: 0 %
BILIRUB SERPL-MCNC: 0.3 MG/DL (ref 0–1)
BUN SERPL-MCNC: 13 MG/DL (ref 8–22)
C REACTIVE PROTEIN LHE: 4.8 MG/DL (ref 0–0.8)
CALCIUM SERPL-MCNC: 8.9 MG/DL (ref 8.5–10.5)
CHLORIDE BLD-SCNC: 100 MMOL/L (ref 98–107)
CO2 SERPL-SCNC: 21 MMOL/L (ref 22–31)
CREAT SERPL-MCNC: 1.03 MG/DL (ref 0.7–1.3)
DEPRECATED S PYO AG THROAT QL EIA: NEGATIVE
EOSINOPHIL # BLD AUTO: 0 10E3/UL (ref 0–0.7)
EOSINOPHIL NFR BLD AUTO: 0 %
ERYTHROCYTE [DISTWIDTH] IN BLOOD BY AUTOMATED COUNT: 11.4 % (ref 10–15)
ERYTHROCYTE [SEDIMENTATION RATE] IN BLOOD BY WESTERGREN METHOD: 8 MM/HR (ref 0–15)
FLUAV AG SPEC QL IA: NEGATIVE
FLUBV AG SPEC QL IA: NEGATIVE
GFR SERPL CREATININE-BSD FRML MDRD: >90 ML/MIN/1.73M2
GLUCOSE BLD-MCNC: 260 MG/DL (ref 70–125)
GROUP A STREP BY PCR: NOT DETECTED
HCT VFR BLD AUTO: 44.2 % (ref 40–53)
HGB BLD-MCNC: 15.4 G/DL (ref 13.3–17.7)
IMM GRANULOCYTES # BLD: 0 10E3/UL
IMM GRANULOCYTES NFR BLD: 0 %
LYMPHOCYTES # BLD AUTO: 1 10E3/UL (ref 0.8–5.3)
LYMPHOCYTES NFR BLD AUTO: 24 %
MCH RBC QN AUTO: 29.8 PG (ref 26.5–33)
MCHC RBC AUTO-ENTMCNC: 34.8 G/DL (ref 31.5–36.5)
MCV RBC AUTO: 86 FL (ref 78–100)
MONOCYTES # BLD AUTO: 0.5 10E3/UL (ref 0–1.3)
MONOCYTES NFR BLD AUTO: 12 %
NEUTROPHILS # BLD AUTO: 2.6 10E3/UL (ref 1.6–8.3)
NEUTROPHILS NFR BLD AUTO: 64 %
PLATELET # BLD AUTO: 145 10E3/UL (ref 150–450)
POTASSIUM BLD-SCNC: 4.2 MMOL/L (ref 3.5–5)
PROT SERPL-MCNC: 7 G/DL (ref 6–8)
RBC # BLD AUTO: 5.17 10E6/UL (ref 4.4–5.9)
SODIUM SERPL-SCNC: 133 MMOL/L (ref 136–145)
WBC # BLD AUTO: 4 10E3/UL (ref 4–11)

## 2022-06-04 PROCEDURE — 87804 INFLUENZA ASSAY W/OPTIC: CPT | Performed by: FAMILY MEDICINE

## 2022-06-04 PROCEDURE — 86618 LYME DISEASE ANTIBODY: CPT | Performed by: FAMILY MEDICINE

## 2022-06-04 PROCEDURE — 36415 COLL VENOUS BLD VENIPUNCTURE: CPT | Performed by: FAMILY MEDICINE

## 2022-06-04 PROCEDURE — 87651 STREP A DNA AMP PROBE: CPT | Performed by: FAMILY MEDICINE

## 2022-06-04 PROCEDURE — 85652 RBC SED RATE AUTOMATED: CPT | Performed by: FAMILY MEDICINE

## 2022-06-04 PROCEDURE — 80053 COMPREHEN METABOLIC PANEL: CPT | Performed by: FAMILY MEDICINE

## 2022-06-04 PROCEDURE — 87798 DETECT AGENT NOS DNA AMP: CPT | Mod: 90 | Performed by: FAMILY MEDICINE

## 2022-06-04 PROCEDURE — 85025 COMPLETE CBC W/AUTO DIFF WBC: CPT | Performed by: FAMILY MEDICINE

## 2022-06-04 PROCEDURE — 99214 OFFICE O/P EST MOD 30 MIN: CPT | Performed by: FAMILY MEDICINE

## 2022-06-04 PROCEDURE — 99000 SPECIMEN HANDLING OFFICE-LAB: CPT | Performed by: FAMILY MEDICINE

## 2022-06-04 PROCEDURE — 86140 C-REACTIVE PROTEIN: CPT | Performed by: FAMILY MEDICINE

## 2022-06-04 RX ORDER — DOXYCYCLINE 100 MG/1
100 CAPSULE ORAL 2 TIMES DAILY
Qty: 42 CAPSULE | Refills: 0 | Status: SHIPPED | OUTPATIENT
Start: 2022-06-04 | End: 2022-06-25

## 2022-06-04 NOTE — PATIENT INSTRUCTIONS
Your strep test and influenza your testing for strep and influenza are both negative.  Your platelets are very mildly low but otherwise your complete blood count is normal.    I am suspicious of a possible tickborne illness and I have sent over prescription for doxycycline to treat both possible Lyme disease or ehrlichiosis.  Please finish the entire 21-day course.  We will let you know the results of the blood work as we get them back through [facility].    Please follow-up if your symptoms are getting significantly worse or not improving over the next 3 to 4 days.

## 2022-06-04 NOTE — LETTER
June 4, 2022      Bryan Grant  [address]  [facility] [address]        To Whom It May Concern:    Bryan Grant  was seen on 6/4/2022.  Please excuse him 6/4/2022  until 6/6/2022 due to illness.        Sincerely,        Bibiana Blank MD

## 2022-06-04 NOTE — PROGRESS NOTES
Assessment:       Fever, unspecified fever cause    - Streptococcus A Rapid Screen w/Reflex to PCR - Clinic Collect  - Lyme Disease Total Abs Bld with Reflex to Confirm CLIA  - Ehrlichia Anaplasma Sp by PCR  - CBC with platelets differential  - Comprehensive metabolic panel  - ESR: Erythrocyte sedimentation rate  - CRP, inflammation  - Influenza A & B Antigen - Clinic Collect    - Group A Streptococcus PCR Throat Swab  - doxycycline hyclate (VIBRAMYCIN) 100 MG capsule  Dispense: 42 capsule; Refill: 0    Tick bite of abdominal wall, initial encounter    - doxycycline hyclate (VIBRAMYCIN) 100 MG capsule  Dispense: 42 capsule; Refill: 0    Thrombocytopenia (H)    Plan:     Patient with recent multiple tick bites now with fever, body aches, headache, mild thrombocytopenia.  Concern for possible ehrlichiosis/tickborne illness.  We will treat empirically with doxycycline.  Labs ordered as noted above.  Rapid strep screen and influenza negative.  COVID-19 testing negative as well.  Follow-up in the ED if symptoms getting significantly worse or not improving over the next 3 days.  We will notify him of the results of his remaining blood work.  If symptoms improved with doxycycline would recommend finishing entire course even if lab testing for tickborne illness comes back negative given recent tick bites and constellation of symptoms.  Patient is agreeable with this plan.    MEDICATIONS:   Orders Placed This Encounter   Medications     doxycycline hyclate (VIBRAMYCIN) 100 MG capsule     Sig: Take 1 capsule (100 mg) by mouth 2 times daily for 21 days     Dispense:  42 capsule     Refill:  0       Subjective:       32 year old male presents for evaluation of a 1 day history of fever of 101.3, some mild headache, neck and upper back pain, and overall body aches.  He has had some occasional nausea.  He also has a sore throat.  He denies much nasal congestion or cough.  He does note that he has had several tick bites over the  past week.  He has not noticed any rashes.  He is out in the woods a lot.  He woke up this morning with his left hand tingling but this has not been persistent.    Patient Active Problem List   Diagnosis     Essential hypertension     Generalized anxiety disorder     Hyperlipidemia     Recurrent major depressive disorder (H)     Shortness of breath     Type 2 diabetes mellitus (H)     Tobacco dependence syndrome     Class 1 obesity due to excess calories without serious comorbidity with body mass index (BMI) of 33.0 to 33.9 in adult     Dry skin       Past Medical History:   Diagnosis Date     Essential hypertension 2/6/2017    Overview:  Hypertension (HTN) NOS Overview:  Overview:  Hypertension (HTN) NOS      Hyperlipidemia 2/27/2017    Overview:  Previously declined intervention. Overview:  Overview:  Previously declined intervention.      Recurrent major depressive disorder (H) 11/14/2017    Overview:  Generalized anxiety disorder, panic disorder, Major depressive disorder,  recurrent, moderate, Rule out personality disorder per Psych. Zoloft 100  mg 1 tablet daily and Ativan 0.5 mg 1 tablet up to 2 times a day as needed  for episodes of severe anxiety or panic attacks prescribed by psych.   Continue to follow with psych/Behavioral Health.    Overview:  Overview:  Generalized anxiety      Shortness of breath 3/4/2015     Tobacco dependence syndrome 2/6/2017     Type 2 diabetes mellitus (H) 2/6/2017    Overview:  Diabetes Mellitus Type 2 Overview:  Overview:  Diabetes Mellitus Type 2        Past Surgical History:   Procedure Laterality Date     OTHER SURGICAL HISTORY      arm surgery       Current Outpatient Medications   Medication     ammonium lactate (LAC-HYDRIN) 12 % external lotion     lisinopril (ZESTRIL) 5 MG tablet     metFORMIN (GLUCOPHAGE) 500 MG tablet     alcohol swab prep pads     blood glucose (NO BRAND SPECIFIED) test strip     blood glucose calibration (NO BRAND SPECIFIED) solution     blood  glucose monitoring (NO BRAND SPECIFIED) meter device kit     diclofenac (VOLTAREN) 75 MG EC tablet     hydrOXYzine (VISTARIL) 25 MG capsule     thin (NO BRAND SPECIFIED) lancets     No current facility-administered medications for this visit.       Allergies   Allergen Reactions     Erythromycin-Sulfisoxazole Unknown       Family History   Problem Relation Age of Onset     Cerebrovascular Disease Father         just  of massive stroke last week       Social History     Socioeconomic History     Marital status: Single     Spouse name: None     Number of children: None     Years of education: None     Highest education level: None   Tobacco Use     Smoking status: Current Every Day Smoker     Last attempt to quit: 2019     Years since quitting: 3.4     Smokeless tobacco: Never Used   Substance and Sexual Activity     Alcohol use: No     Drug use: No         Review of Systems  Pertinent items are noted in HPI.      Objective:                     General Appearance:    /89   Pulse 112   Temp 99.7  F (37.6  C) (Oral)   Resp 16   Wt 103.9 kg (229 lb 1.6 oz)   SpO2 98%   BMI 32.43 kg/m          Alert, pleasant, cooperative, no distress, appears stated age   Head:    Normocephalic, without obvious abnormality, atraumatic   Eyes:    Conjunctiva/corneas clear   Ears:    Normal TM's without erythema or bulging. Normal external ear canals, both ears   Nose:   Nares normal, septum midline, mucosa normal, no drainage    or sinus tenderness   Throat:   Lips, mucosa, and tongue normal; teeth and gums normal.  No tonsilar hypertrophy or exudate.   Neck:   Supple, symmetrical, trachea midline, no adenopathy    Lungs:     Clear to auscultation bilaterally without wheezes, rales, or rhonchi, respirations unlabored    Heart:    Regular rate and rhythm, S1 and S2 normal, no murmur, rub or gallop  Abdomen: Soft, nontender                                Neuro:  Nonfocal   Extremities:   Extremities normal, atraumatic,  no cyanosis or edema   Skin:   Skin color, texture, turgor normal, no rashes or lesions           Results for orders placed or performed in visit on 06/04/22   CBC with platelets and differential     Status: Abnormal   Result Value Ref Range    WBC Count 4.0 4.0 - 11.0 10e3/uL    RBC Count 5.17 4.40 - 5.90 10e6/uL    Hemoglobin 15.4 13.3 - 17.7 g/dL    Hematocrit 44.2 40.0 - 53.0 %    MCV 86 78 - 100 fL    MCH 29.8 26.5 - 33.0 pg    MCHC 34.8 31.5 - 36.5 g/dL    RDW 11.4 10.0 - 15.0 %    Platelet Count 145 (L) 150 - 450 10e3/uL    % Neutrophils 64 %    % Lymphocytes 24 %    % Monocytes 12 %    % Eosinophils 0 %    % Basophils 0 %    % Immature Granulocytes 0 %    Absolute Neutrophils 2.6 1.6 - 8.3 10e3/uL    Absolute Lymphocytes 1.0 0.8 - 5.3 10e3/uL    Absolute Monocytes 0.5 0.0 - 1.3 10e3/uL    Absolute Eosinophils 0.0 0.0 - 0.7 10e3/uL    Absolute Basophils 0.0 0.0 - 0.2 10e3/uL    Absolute Immature Granulocytes 0.0 <=0.4 10e3/uL   Streptococcus A Rapid Screen w/Reflex to PCR - Clinic Collect     Status: Normal    Specimen: Throat; Swab   Result Value Ref Range    Group A Strep antigen Negative Negative   Influenza A & B Antigen - Clinic Collect     Status: Normal    Specimen: Nose; Swab   Result Value Ref Range    Influenza A antigen Negative Negative    Influenza B antigen Negative Negative    Narrative    Test results must be correlated with clinical data. If necessary, results should be confirmed by a molecular assay or viral culture.   CBC with platelets differential     Status: Abnormal    Narrative    The following orders were created for panel order CBC with platelets differential.  Procedure                               Abnormality         Status                     ---------                               -----------         ------                     CBC with platelets and d...[[id number]]  Abnormal            Final result                 Please view results for these tests on the individual orders.        This note has been dictated using voice recognition software. Any grammatical or context distortions are unintentional and inherent to the software

## 2022-06-05 LAB — B BURGDOR IGG+IGM SER QL: 0.17

## 2022-06-06 ENCOUNTER — OFFICE VISIT (OUTPATIENT)
Dept: FAMILY MEDICINE | Facility: CLINIC | Age: 32
End: 2022-06-06
Payer: COMMERCIAL

## 2022-06-06 ENCOUNTER — HOSPITAL ENCOUNTER (OUTPATIENT)
Dept: GENERAL RADIOLOGY | Facility: HOSPITAL | Age: 32
Discharge: HOME | End: 2022-06-06
Attending: NURSE PRACTITIONER | Admitting: NURSE PRACTITIONER
Payer: COMMERCIAL

## 2022-06-06 VITALS
DIASTOLIC BLOOD PRESSURE: 79 MMHG | BODY MASS INDEX: 31.77 KG/M2 | OXYGEN SATURATION: 97 % | SYSTOLIC BLOOD PRESSURE: 130 MMHG | RESPIRATION RATE: 26 BRPM | WEIGHT: 224.4 LBS | HEART RATE: 107 BPM | TEMPERATURE: 101 F

## 2022-06-06 DIAGNOSIS — R05.9 COUGH: ICD-10-CM

## 2022-06-06 DIAGNOSIS — R50.9 FEVER, UNSPECIFIED FEVER CAUSE: ICD-10-CM

## 2022-06-06 DIAGNOSIS — R05.9 COUGH: Primary | ICD-10-CM

## 2022-06-06 LAB
ERYTHROCYTE [DISTWIDTH] IN BLOOD BY AUTOMATED COUNT: 11.1 % (ref 10–15)
HCT VFR BLD AUTO: 42.1 % (ref 40–53)
HGB BLD-MCNC: 14.8 G/DL (ref 13.3–17.7)
MCH RBC QN AUTO: 29.4 PG (ref 26.5–33)
MCHC RBC AUTO-ENTMCNC: 35.2 G/DL (ref 31.5–36.5)
MCV RBC AUTO: 84 FL (ref 78–100)
PLATELET # BLD AUTO: 129 10E3/UL (ref 150–450)
RBC # BLD AUTO: 5.03 10E6/UL (ref 4.4–5.9)
WBC # BLD AUTO: 3.1 10E3/UL (ref 4–11)

## 2022-06-06 PROCEDURE — U0003 INFECTIOUS AGENT DETECTION BY NUCLEIC ACID (DNA OR RNA); SEVERE ACUTE RESPIRATORY SYNDROME CORONAVIRUS 2 (SARS-COV-2) (CORONAVIRUS DISEASE [COVID-19]), AMPLIFIED PROBE TECHNIQUE, MAKING USE OF HIGH THROUGHPUT TECHNOLOGIES AS DESCRIBED BY CMS-2020-01-R: HCPCS | Performed by: NURSE PRACTITIONER

## 2022-06-06 PROCEDURE — 71046 X-RAY EXAM CHEST 2 VIEWS: CPT | Mod: FY

## 2022-06-06 PROCEDURE — U0005 INFEC AGEN DETEC AMPLI PROBE: HCPCS | Performed by: NURSE PRACTITIONER

## 2022-06-06 PROCEDURE — 36415 COLL VENOUS BLD VENIPUNCTURE: CPT | Performed by: NURSE PRACTITIONER

## 2022-06-06 PROCEDURE — 85027 COMPLETE CBC AUTOMATED: CPT | Performed by: NURSE PRACTITIONER

## 2022-06-06 PROCEDURE — 99213 OFFICE O/P EST LOW 20 MIN: CPT | Mod: CS | Performed by: NURSE PRACTITIONER

## 2022-06-06 NOTE — PROGRESS NOTES
Assessment & Plan     Cough    - XR Chest 2 Views  - CBC with platelets  - CBC with platelets  - Symptomatic; Yes; 6/3/2022 COVID-19 Virus (Coronavirus) by PCR Nose    Fever, unspecified fever cause    - XR Chest 2 Views  - CBC with platelets  - CBC with platelets  - Symptomatic; Yes; 6/3/2022 COVID-19 Virus (Coronavirus) by PCR Nose     Patient with third visit in 3 days for fever.    Patient was placed on doxycycline for low platelet count 145 after visit 1 for presumed tickborne illness.  Platelet count was 129 today.    Was mainly concerned about a small patch of white exudate versus tonsillolith on the right tonsil.  Had a negative strep test on visit 1 with a fever.  Do not feel compelled to repeat this or do any further work-up right now.    Most likely a viral syndrome;  with WBC count today of 3.1.  Plt now 129. Did not have a PCR COVID test yet.      Stay home pending COVID results and resolution of fever.    Should call PCP if negative Ehrlichia and Anaplasma, to discuss doxycycline and any further work-up needed/status update.  Chest x-ray was negative today.    Can do symptomatic care for now including cough medication and Tylenol or ibuprofen as needed.  25 minutes spent on the date of the encounter doing chart review, review of outside records, review of test results, patient visit and documentation         No follow-ups on file.    Nicole Shankar [facility]    Jaydon Adams is a 32 year old male who presents to clinic today for the following health issues:  Chief Complaint   Patient presents with     Cough     See 22 encounter. Painful to swallow, fatigue, semi loss of appetite. Pt states  had thrush and possible thrush.     Chest Pain     See 22 encounter. Pt states chest pain when deep inhale, has not changed; got worse.     HPI    Was started on doxycycline on  with concern for possible tickborne illness, running with fever and body  aches.  Flu and strep were negative.  Went to the ER as well yesterday ongoing complaints of fatigue, joint aches.  Given a hydrocodone prescription and told to wait for results.    Patient is back again today with the same symptoms.  Feeling a little worse.  Specifically concerned about a small patch of white that he saw the right tonsil.     COVID-19 was negative.  Lyme's was negative and Ehrlichia/anaplasmosis are pending.  Had slightly low platelet count at 145.     Ongoing cough.  Is a smoker.  Is on doxycycline for 21 days for possible anaplasmosis.  Chest hurts with deep breaths.      Fever started overall 3 days ago.        Review of Systems  See HPI      Objective    /79 (BP Location: Right arm, Patient Position: Sitting, Cuff Size: Adult Regular)   Pulse 107   Temp (!) 101  F (38.3  C) (Oral)   Resp 26   Wt 101.8 kg (224 lb 6.4 oz)   SpO2 97%   BMI 31.77 kg/m    Physical Exam  Constitutional:       Appearance: He is well-developed.   HENT:      Right Ear: External ear normal.      Left Ear: External ear normal.      Mouth/Throat:      Tonsils: Tonsillar exudate (rt tonsil vs tonsilith ) present. 2+ on the right. 2+ on the left.   Eyes:      General:         Right eye: No discharge.         Left eye: No discharge.      Conjunctiva/sclera: Conjunctivae normal.   Cardiovascular:      Rate and Rhythm: Tachycardia present.   Pulmonary:      Effort: Pulmonary effort is normal.      Breath sounds: Normal breath sounds.   Musculoskeletal:         General: Normal range of motion.   Skin:     General: Skin is warm.   Neurological:      Mental Status: He is alert and oriented to person, place, and time.   Psychiatric:         Mood and Affect: Mood normal.         Behavior: Behavior normal.         Thought Content: Thought content normal.         Judgment: Judgment normal.        Results for orders placed or performed during the hospital encounter of 06/06/22   XR Chest 2 Views     Status: None     Narrative    EXAM: XR CHEST 2 VW  LOCATION: [facility]  DATE/TIME: 6/6/2022 7:26 PM    INDICATION: cough, fever  COMPARISON: 07/24/2019.      Impression    IMPRESSION: Negative chest.   Results for orders placed or performed in visit on 06/06/22   CBC with platelets     Status: Abnormal   Result Value Ref Range    WBC Count 3.1 (L) 4.0 - 11.0 10e3/uL    RBC Count 5.03 4.40 - 5.90 10e6/uL    Hemoglobin 14.8 13.3 - 17.7 g/dL    Hematocrit 42.1 40.0 - 53.0 %    MCV 84 78 - 100 fL    MCH 29.4 26.5 - 33.0 pg    MCHC 35.2 31.5 - 36.5 g/dL    RDW 11.1 10.0 - 15.0 %    Platelet Count 129 (L) 150 - 450 10e3/uL

## 2022-06-06 NOTE — LETTER
[address] [address]  Phone: [phone]  Fax: [phone]    June 6, 2022        Bryan Grant  [address] [facility] [address]          To whom it may concern:    RE: Bryan Grant    Patient was seen and treated today at our clinic.  Please excuse him from work for illness with fever and then as per CDC guidelines based on COVID results.    Please contact me for questions or concerns.      Sincerely,        Nicole Shankar, CNP

## 2022-06-06 NOTE — TELEPHONE ENCOUNTER
RN attempted to call patient regarding his [employer] message.    Patient did not answer. His mail box is full and unable to leave message.       RN will try to call later.                  Ping Espitia RN  [facility]

## 2022-06-07 LAB — SARS-COV-2 RNA RESP QL NAA+PROBE: NEGATIVE

## 2022-06-07 NOTE — TELEPHONE ENCOUNTER
RN sent patient message via [employer] to DB the appointment.      Appointments in Next    Jun 14, 2022  3:40 PM  (Arrive by 3:20 PM)  Provider Visit with Kristine Cordoba DO  [facility] ([facility] ) [phone]               Ping Espitia RN  [facility]   Female

## 2022-06-07 NOTE — TELEPHONE ENCOUNTER
Kristine Cordoba, DO  You 14 hours ago (6:19 PM)       Yes okay to double book at the 3:40 spot on 6/14

## 2022-06-07 NOTE — PATIENT INSTRUCTIONS
Your x-ray was normal.  No pneumonia.     Covid test today.      I would continue to treat this with symptomatic care such as Tylenol or ibuprofen, rest.    We will call if your tick illness tests are positive.    If they are negative, please call your primary care doctor for further advice.    You may use over-the-counter cough and cold medication as needed such as Robitussin or DayQuil NyQuil.

## 2022-06-08 LAB
A PHAGOCYTOPH DNA BLD QL NAA+PROBE: NOT DETECTED
E CHAFFEENSIS DNA BLD QL NAA+PROBE: NOT DETECTED
E EWINGII DNA SPEC QL NAA+PROBE: NOT DETECTED
EHRLICHIA DNA SPEC QL NAA+PROBE: NOT DETECTED

## 2022-06-14 ENCOUNTER — OFFICE VISIT (OUTPATIENT)
Dept: FAMILY MEDICINE | Facility: CLINIC | Age: 32
End: 2022-06-14
Payer: COMMERCIAL

## 2022-06-14 VITALS
HEART RATE: 106 BPM | DIASTOLIC BLOOD PRESSURE: 89 MMHG | RESPIRATION RATE: 18 BRPM | TEMPERATURE: 97.8 F | BODY MASS INDEX: 31.85 KG/M2 | SYSTOLIC BLOOD PRESSURE: 137 MMHG | WEIGHT: 225 LBS

## 2022-06-14 DIAGNOSIS — E11.9 TYPE 2 DIABETES MELLITUS WITHOUT COMPLICATION, WITH LONG-TERM CURRENT USE OF INSULIN (H): ICD-10-CM

## 2022-06-14 DIAGNOSIS — R74.01 ELEVATED ALT MEASUREMENT: ICD-10-CM

## 2022-06-14 DIAGNOSIS — Z91.89 LACK OF MOTIVATION: Primary | ICD-10-CM

## 2022-06-14 DIAGNOSIS — Z79.4 TYPE 2 DIABETES MELLITUS WITHOUT COMPLICATION, WITH LONG-TERM CURRENT USE OF INSULIN (H): ICD-10-CM

## 2022-06-14 DIAGNOSIS — E87.1 HYPONATREMIA: ICD-10-CM

## 2022-06-14 DIAGNOSIS — D69.6 TEMPORARY LOW PLATELET COUNT (H): ICD-10-CM

## 2022-06-14 LAB
ERYTHROCYTE [DISTWIDTH] IN BLOOD BY AUTOMATED COUNT: 11.7 % (ref 10–15)
HCT VFR BLD AUTO: 41.6 % (ref 40–53)
HGB BLD-MCNC: 14.3 G/DL (ref 13.3–17.7)
MCH RBC QN AUTO: 29.1 PG (ref 26.5–33)
MCHC RBC AUTO-ENTMCNC: 34.4 G/DL (ref 31.5–36.5)
MCV RBC AUTO: 85 FL (ref 78–100)
PLATELET # BLD AUTO: 291 10E3/UL (ref 150–450)
RBC # BLD AUTO: 4.92 10E6/UL (ref 4.4–5.9)
TSH SERPL DL<=0.005 MIU/L-ACNC: 0.86 UIU/ML (ref 0.3–5)
VIT B12 SERPL-MCNC: 997 PG/ML (ref 213–816)
WBC # BLD AUTO: 8 10E3/UL (ref 4–11)

## 2022-06-14 PROCEDURE — 36415 COLL VENOUS BLD VENIPUNCTURE: CPT | Performed by: FAMILY MEDICINE

## 2022-06-14 PROCEDURE — 85027 COMPLETE CBC AUTOMATED: CPT | Performed by: FAMILY MEDICINE

## 2022-06-14 PROCEDURE — 82306 VITAMIN D 25 HYDROXY: CPT | Performed by: FAMILY MEDICINE

## 2022-06-14 PROCEDURE — 84443 ASSAY THYROID STIM HORMONE: CPT | Performed by: FAMILY MEDICINE

## 2022-06-14 PROCEDURE — 80053 COMPREHEN METABOLIC PANEL: CPT | Performed by: FAMILY MEDICINE

## 2022-06-14 PROCEDURE — 82607 VITAMIN B-12: CPT | Performed by: FAMILY MEDICINE

## 2022-06-14 PROCEDURE — 99214 OFFICE O/P EST MOD 30 MIN: CPT | Performed by: FAMILY MEDICINE

## 2022-06-14 RX ORDER — METFORMIN HCL 500 MG
1000 TABLET, EXTENDED RELEASE 24 HR ORAL 2 TIMES DAILY WITH MEALS
Qty: 360 TABLET | Refills: 0 | Status: SHIPPED | OUTPATIENT
Start: 2022-06-14 | End: 2022-07-27

## 2022-06-14 RX ORDER — ESCITALOPRAM OXALATE 10 MG/1
10 TABLET ORAL DAILY
Qty: 30 TABLET | Refills: 0 | Status: SHIPPED | OUTPATIENT
Start: 2022-06-14 | End: 2022-08-02

## 2022-06-14 ASSESSMENT — ANXIETY QUESTIONNAIRES
5. BEING SO RESTLESS THAT IT IS HARD TO SIT STILL: SEVERAL DAYS
2. NOT BEING ABLE TO STOP OR CONTROL WORRYING: NOT AT ALL
GAD7 TOTAL SCORE: 8
6. BECOMING EASILY ANNOYED OR IRRITABLE: MORE THAN HALF THE DAYS
1. FEELING NERVOUS, ANXIOUS, OR ON EDGE: NEARLY EVERY DAY
7. FEELING AFRAID AS IF SOMETHING AWFUL MIGHT HAPPEN: NOT AT ALL
3. WORRYING TOO MUCH ABOUT DIFFERENT THINGS: SEVERAL DAYS
GAD7 TOTAL SCORE: 8

## 2022-06-14 ASSESSMENT — ENCOUNTER SYMPTOMS: FATIGUE: 1

## 2022-06-14 ASSESSMENT — PATIENT HEALTH QUESTIONNAIRE - PHQ9: 5. POOR APPETITE OR OVEREATING: SEVERAL DAYS

## 2022-06-14 NOTE — PROGRESS NOTES
Bryan was seen today for fatigue.    Diagnoses and all orders for this visit:    Lack of motivation: This is his main concern today.  We discussed that this is likely multifactorial and no magical pill.  He is known to drink multiple energy drinks a day which I do not think is helping.  Usually the monster drinks but has started 5-hour energy now 2.  Will test for thyroid, vitamin D, B12 looking for any other organic causes.  He had other labs done recently and I reviewed those.  In addition it sounds like he has history of depression anxiety noted on his chart but he does not quite fully agree with the depression at this time.  PHQ-9 of 9 today.  No suicidal ideations.  Does have anxiety but he is learning to use tools and tactics to get past and without medication.  Does not like taking medication.  States he has been on medication for ADHD in past.  But he is not asking for that today.  I discussed with them I would not  feel comfortable anyway as I cannot find the diagnosis or treatment on the chart.  We will consider sending him to psychology just to get a diagnosis.  He does not want any therapy however.  After discussion of options we decided to start Lexapro today.  Reviewed common side effects and when to call otherwise they should resolve with continued treatment.  Discussed takes 4 to 6 weeks to feel full effects.  But will continue to work together during that time to improve his symptoms.  -     escitalopram (LEXAPRO) 10 MG tablet; Take 1 tablet (10 mg) by mouth daily  -     TSH; Future  -     Vitamin D Deficiency; Future  -     Vitamin B12; Future    Type 2 diabetes mellitus without complication, with long-term current use of insulin (H): He admits he sometimes forgets to take his metformin.  Right now he is only taking 2 tablets in the morning when he remembers.  Discussed taking 2 tablets twice a day as he remembers.  Discussed that his uncontrolled diabetes likely contributing to his lack of  energy and motivation as above  -     metFORMIN (GLUCOPHAGE XR) 500 MG 24 hr tablet; Take 2 tablets (1,000 mg) by mouth 2 times daily (with meals)    Temporary low platelet count (H): Recheck today.  Symptoms of acute illness have resolved.  Infectious disease test negative.  He stopped his doxycycline.  -     CBC with platelets; Future  -     CBC with platelets    Elevated ALT measurement: Recheck today  -     Comprehensive metabolic panel (BMP + Alb, Alk Phos, ALT, AST, Total. Bili, TP); Future    Hyponatremia: Recheck today  -     Comprehensive metabolic panel (BMP + Alb, Alk Phos, ALT, AST, Total. Bili, TP); Future          Subjective   Bryan is a 32 year old who presents for the following health issues     Fatigue  Associated symptoms include fatigue.   History of Present Illness       Reason for visit:  Lack of motivation    He eats 0-1 servings of fruits and vegetables daily.He consumes 3 sweetened beverage(s) daily.He exercises with enough effort to increase his heart rate 9 or less minutes per day.  He exercises with enough effort to increase his heart rate 3 or less days per week. He is missing 2 dose(s) of medications per week.  He is not taking prescribed medications regularly due to remembering to take.         Review of Systems   Constitutional: Positive for fatigue.      No motivation.   Hx of adhd  On adderall in the past.   Was on it when he was younger. Not adderal but something else- concerta  Just wants to have energy again  Also a focus issue  Used to have depression and anxiety when he was younger.   werid about taking meds  Was probably thorugh a different health system.   Concerta, zoloft  paxil on chart- 2019 . He doesn't remember this.     PHQ-9 score:    PHQ 6/14/2022   PHQ-9 Total Score 9- not sure why this didn't load   Q9: Thoughts of better off dead/self-harm past 2 weeks Not at all       DOUG-7 SCORE 6/14/2022   Total Score 8     He was also seen 3 times last week for fever, body  aches.  Started doxycycline for possible Lyme disease or other tickborne illness.  Platelets were noted to be low, ALT elevated.  Little bit of hyponatremia.  All test came back negative and patient stopped the doxycycline himself.  He is feeling better and symptoms have resolved at this time.      Constitutional, HEENT, cardiovascular, pulmonary, GI, , musculoskeletal, neuro, skin, endocrine and psych systems are negative, except as otherwise noted.      Objective    /89 (BP Location: Left arm, Patient Position: Sitting, Cuff Size: Adult Regular)   Pulse 106   Temp 97.8  F (36.6  C) (Temporal)   Resp 18   Wt 102.1 kg (225 lb)   BMI 31.85 kg/m    Body mass index is 31.85 kg/m .  Physical Exam   GENERAL: alert, no distress and obese  RESP: no resp distress  CV: acyanotic  MS: no gross musculoskeletal defects noted, no edema  PSYCH: mentation appears normal, affect normal/bright and talks very fast and moves from one thing to the next. Anxious affect.

## 2022-06-15 LAB
ALBUMIN SERPL-MCNC: 3.9 G/DL (ref 3.5–5)
ALP SERPL-CCNC: 111 U/L (ref 45–120)
ALT SERPL W P-5'-P-CCNC: 87 U/L (ref 0–45)
ANION GAP SERPL CALCULATED.3IONS-SCNC: 16 MMOL/L (ref 5–18)
AST SERPL W P-5'-P-CCNC: 26 U/L (ref 0–40)
BILIRUB SERPL-MCNC: 0.4 MG/DL (ref 0–1)
BUN SERPL-MCNC: 14 MG/DL (ref 8–22)
CALCIUM SERPL-MCNC: 9.7 MG/DL (ref 8.5–10.5)
CHLORIDE BLD-SCNC: 100 MMOL/L (ref 98–107)
CO2 SERPL-SCNC: 19 MMOL/L (ref 22–31)
CREAT SERPL-MCNC: 1.05 MG/DL (ref 0.7–1.3)
DEPRECATED CALCIDIOL+CALCIFEROL SERPL-MC: 22 UG/L (ref 20–75)
GFR SERPL CREATININE-BSD FRML MDRD: >90 ML/MIN/1.73M2
GLUCOSE BLD-MCNC: 385 MG/DL (ref 70–125)
POTASSIUM BLD-SCNC: 4.6 MMOL/L (ref 3.5–5)
PROT SERPL-MCNC: 7 G/DL (ref 6–8)
SODIUM SERPL-SCNC: 135 MMOL/L (ref 136–145)

## 2022-07-26 ENCOUNTER — HOSPITAL ENCOUNTER (EMERGENCY)
Facility: HOSPITAL | Age: 32
Discharge: LEFT BEFORE DISCHARGE | End: 2022-07-26
Payer: COMMERCIAL

## 2022-07-26 VITALS
BODY MASS INDEX: 32.21 KG/M2 | OXYGEN SATURATION: 97 % | WEIGHT: 225 LBS | RESPIRATION RATE: 16 BRPM | DIASTOLIC BLOOD PRESSURE: 100 MMHG | TEMPERATURE: 97.3 F | HEART RATE: 101 BPM | HEIGHT: 70 IN | SYSTOLIC BLOOD PRESSURE: 140 MMHG

## 2022-07-27 ENCOUNTER — HOSPITAL ENCOUNTER (OUTPATIENT)
Dept: GENERAL RADIOLOGY | Facility: HOSPITAL | Age: 32
Discharge: HOME | End: 2022-07-27
Attending: PHYSICIAN ASSISTANT | Admitting: PHYSICIAN ASSISTANT
Payer: COMMERCIAL

## 2022-07-27 ENCOUNTER — OFFICE VISIT (OUTPATIENT)
Dept: FAMILY MEDICINE | Facility: CLINIC | Age: 32
End: 2022-07-27
Payer: COMMERCIAL

## 2022-07-27 VITALS
BODY MASS INDEX: 31.81 KG/M2 | WEIGHT: 221.7 LBS | OXYGEN SATURATION: 97 % | HEART RATE: 88 BPM | DIASTOLIC BLOOD PRESSURE: 86 MMHG | TEMPERATURE: 98.7 F | RESPIRATION RATE: 22 BRPM | SYSTOLIC BLOOD PRESSURE: 135 MMHG

## 2022-07-27 DIAGNOSIS — E11.9 TYPE 2 DIABETES MELLITUS WITHOUT COMPLICATION, WITHOUT LONG-TERM CURRENT USE OF INSULIN (H): ICD-10-CM

## 2022-07-27 DIAGNOSIS — Z20.822 EXPOSURE TO 2019 NOVEL CORONAVIRUS: ICD-10-CM

## 2022-07-27 DIAGNOSIS — L97.515 DIABETIC ULCER OF TOE OF RIGHT FOOT ASSOCIATED WITH TYPE 2 DIABETES MELLITUS, WITH MUSCLE INVOLVEMENT WITHOUT EVIDENCE OF NECROSIS (H): Primary | ICD-10-CM

## 2022-07-27 DIAGNOSIS — E11.621 DIABETIC ULCER OF TOE OF RIGHT FOOT ASSOCIATED WITH TYPE 2 DIABETES MELLITUS, WITH MUSCLE INVOLVEMENT WITHOUT EVIDENCE OF NECROSIS (H): Primary | ICD-10-CM

## 2022-07-27 LAB — SARS-COV-2 RNA RESP QL NAA+PROBE: NEGATIVE

## 2022-07-27 PROCEDURE — 73630 X-RAY EXAM OF FOOT: CPT | Mod: RT,FY

## 2022-07-27 PROCEDURE — U0003 INFECTIOUS AGENT DETECTION BY NUCLEIC ACID (DNA OR RNA); SEVERE ACUTE RESPIRATORY SYNDROME CORONAVIRUS 2 (SARS-COV-2) (CORONAVIRUS DISEASE [COVID-19]), AMPLIFIED PROBE TECHNIQUE, MAKING USE OF HIGH THROUGHPUT TECHNOLOGIES AS DESCRIBED BY CMS-2020-01-R: HCPCS | Performed by: PHYSICIAN ASSISTANT

## 2022-07-27 PROCEDURE — 99214 OFFICE O/P EST MOD 30 MIN: CPT | Mod: CS | Performed by: PHYSICIAN ASSISTANT

## 2022-07-27 PROCEDURE — U0005 INFEC AGEN DETEC AMPLI PROBE: HCPCS | Performed by: PHYSICIAN ASSISTANT

## 2022-07-27 NOTE — PATIENT INSTRUCTIONS
Keep the area clean dry and protected  Use of the postop shoe  Follow-up with podiatry for definitive evaluation and treatment

## 2022-07-27 NOTE — ED TRIAGE NOTES
2 days ago, pt cut the bottom of his right first toe on the ground after jumping up in the air. He states the pain has been getting worse and hurts to walk. Hx of diabetes. Able to ambulate normally.      Triage Assessment     Row Name 07/26/22 2016       Triage Assessment (Adult)    Airway WDL WDL       Respiratory WDL    Respiratory WDL WDL       Skin Circulation/Temperature WDL    Skin Circulation/Temperature WDL WDL       Cardiac WDL    Cardiac WDL WDL       Peripheral/Neurovascular WDL    Peripheral Neurovascular WDL WDL       Cognitive/Neuro/Behavioral WDL    Cognitive/Neuro/Behavioral WDL WDL       [place] Coma Scale    Best Eye Response 4-->(E4) spontaneous    Best Motor Response 6-->(M6) obeys commands    Best Verbal Response 5-->(V5) oriented    [place] Coma Scale Score 15

## 2022-07-27 NOTE — PROGRESS NOTES
"Patient presents with:  Trauma: Under Rt big toe x 3-4 days. Open wound, some drainage and \"possibly pus\". Pt states applied neosporin.   Concern: Found out covid exposure earlier in [facility] and requesting covid test.      Clinical Decision Making:  Patient has had a 3-day history with a open diabetic foot ulcer.  Patient is to keep the wound clean dry and covered.  He will wash it once daily keep it clean dry and protected.  Use of Xeroform topically with 4 x 4 and Coban loosely applied.  The issue was stressed to not apply the Coban too tightly to cut off circulation.  Also encouraged the patient to discontinue smoking for healing concerns.  Continue to monitor blood glucose levels x-ray was obtained to rule out fracture or osteomyelitis or other underlying bony involvement which there was no involvement on x-ray currently referral to podiatry urgently within the next 2 to 3 days for reevaluation and treatment.  Indication for emergent return to the ER was gone over to include but not limited to increased pain increased redness swelling drainage or constitutional symptoms to include fever chills night sweats or fatigue.      ICD-10-CM    1. Diabetic ulcer of toe of right foot associated with type 2 diabetes mellitus, with muscle involvement without evidence of necrosis (H)  E11.621 XR Foot Right G/E 3 Views    L97.515 Orthopedic  Referral     Ankle/Foot Bracing Supplies Order for DME - ONLY FOR DME     amoxicillin-clavulanate (AUGMENTIN) 875-125 MG tablet   2. Exposure to 2019 novel coronavirus  Z20.822 Symptomatic; Yes; 7/27/2022 COVID-19 Virus (Coronavirus) by PCR Nose   3. Type 2 diabetes mellitus without complication, without long-term current use of insulin (H)  E11.9 amoxicillin-clavulanate (AUGMENTIN) 875-125 MG tablet       Patient Instructions   Keep the area clean dry and protected  Use of the postop shoe  Follow-up with podiatry for definitive evaluation and treatment        Bryan Grant " is a 32 year old male who has known type 2 diabetes who is presenting for evaluation of injury to the plantar surface of the right great toe.  Patient was in [place] 3 days ago and had been drinking.  He is wearing flip-flops.  He describes an injury that he sustained to the foot where he fell out of his flip-flops and avulsed the skin on the plantar surface of the right toe he has been wearing flip-flops and has not done anything for treatment.  Patient shares that his blood glucose levels have been maintaining    History obtained from chart review and the patient.    Problem List:  2022-04: Class 1 obesity due to excess calories without serious   comorbidity with body mass index (BMI) of 33.0 to 33.9 in adult  2022-04: Dry skin  2017-11: Recurrent major depressive disorder (H)  2017-02: Hyperlipidemia  2017-02: Essential hypertension  2017-02: Type 2 diabetes mellitus (H)  2017-02: Tobacco dependence syndrome  2015-03: Shortness of breath  2015-02: Generalized anxiety disorder      Past Medical History:   Diagnosis Date     Essential hypertension 2/6/2017    Overview:  Hypertension (HTN) NOS Overview:  Overview:  Hypertension (HTN) NOS      Hyperlipidemia 2/27/2017    Overview:  Previously declined intervention. Overview:  Overview:  Previously declined intervention.      Recurrent major depressive disorder (H) 11/14/2017    Overview:  Generalized anxiety disorder, panic disorder, Major depressive disorder,  recurrent, moderate, Rule out personality disorder per Psych. Zoloft 100  mg 1 tablet daily and Ativan 0.5 mg 1 tablet up to 2 times a day as needed  for episodes of severe anxiety or panic attacks prescribed by psych.   Continue to follow with psych/Behavioral Health.    Overview:  Overview:  Generalized anxiety      Shortness of breath 3/4/2015     Tobacco dependence syndrome 2/6/2017     Type 2 diabetes mellitus (H) 2/6/2017    Overview:  Diabetes Mellitus Type 2 Overview:  Overview:  Diabetes Mellitus Type 2         Social History     Tobacco Use     Smoking status: Current Some Day Smoker     Last attempt to quit: 1/1/2019     Years since quitting: 3.5     Smokeless tobacco: Never Used   Substance Use Topics     Alcohol use: No       Review of Systems  As above in HPI otherwise negative.    Vitals:    07/27/22 1134   BP: 135/86   BP Location: Right arm   Patient Position: Sitting   Cuff Size: Adult Regular   Pulse: 88   Resp: 22   Temp: 98.7  F (37.1  C)   TempSrc: Oral   SpO2: 97%   Weight: 100.6 kg (221 lb 11.2 oz)       General: Patient is resting comfortably no acute distress is afebrile  HEENT: Head is normocephalic atraumatic   Skin: Without rash non-diaphoretic  On the right great toe plantar surface, there is a 1 cm diameter ulceration that is full-thickness down to the subcutaneous fat and muscle but not through to the fascia or bone.  Dressing consisting of Xeroform 4 x 4 and 1 inch Coban was applied for dressing.    Physical Exam      Labs:  Results for orders placed or performed in visit on 07/27/22   XR Foot Right G/E 3 Views     Status: None    Narrative    EXAM: XR FOOT RIGHT G/E 3 VIEWS  LOCATION: [facility]  DATE/TIME: 7/27/2022 12:12 PM    INDICATION: History of DMT2 and ulcer on the plantar surface of the great toe. Foot pain.  COMPARISON: None.      Impression    IMPRESSION: Normal joint spaces and alignment. No fracture. No osteomyelitis.     At the end of the encounter, I discussed results, diagnosis, medications. Discussed red flags for immediate return to clinic/ER, as well as indications for follow up if no improvement. Patient understood and agreed to plan. Patient was stable for discharge.

## 2022-08-02 ENCOUNTER — OFFICE VISIT (OUTPATIENT)
Dept: FAMILY MEDICINE | Facility: CLINIC | Age: 32
End: 2022-08-02
Payer: COMMERCIAL

## 2022-08-02 VITALS
DIASTOLIC BLOOD PRESSURE: 85 MMHG | BODY MASS INDEX: 32.14 KG/M2 | WEIGHT: 224 LBS | OXYGEN SATURATION: 98 % | HEART RATE: 77 BPM | SYSTOLIC BLOOD PRESSURE: 131 MMHG | TEMPERATURE: 98.3 F

## 2022-08-02 DIAGNOSIS — Z79.4 TYPE 2 DIABETES MELLITUS WITHOUT COMPLICATION, WITH LONG-TERM CURRENT USE OF INSULIN (H): Primary | ICD-10-CM

## 2022-08-02 DIAGNOSIS — E11.9 TYPE 2 DIABETES MELLITUS WITHOUT COMPLICATION, WITH LONG-TERM CURRENT USE OF INSULIN (H): Primary | ICD-10-CM

## 2022-08-02 DIAGNOSIS — E11.621 DIABETIC ULCER OF TOE OF RIGHT FOOT ASSOCIATED WITH TYPE 2 DIABETES MELLITUS, WITH MUSCLE INVOLVEMENT WITHOUT EVIDENCE OF NECROSIS (H): ICD-10-CM

## 2022-08-02 DIAGNOSIS — F41.1 GENERALIZED ANXIETY DISORDER: ICD-10-CM

## 2022-08-02 DIAGNOSIS — L97.515 DIABETIC ULCER OF TOE OF RIGHT FOOT ASSOCIATED WITH TYPE 2 DIABETES MELLITUS, WITH MUSCLE INVOLVEMENT WITHOUT EVIDENCE OF NECROSIS (H): ICD-10-CM

## 2022-08-02 DIAGNOSIS — F41.0 PANIC ATTACK: ICD-10-CM

## 2022-08-02 PROCEDURE — 99214 OFFICE O/P EST MOD 30 MIN: CPT | Performed by: FAMILY MEDICINE

## 2022-08-02 RX ORDER — ALPRAZOLAM 0.25 MG
0.25 TABLET ORAL 2 TIMES DAILY PRN
Qty: 30 TABLET | Refills: 0 | Status: SHIPPED | OUTPATIENT
Start: 2022-08-02

## 2022-08-02 RX ORDER — LANCETS
EACH MISCELLANEOUS
Qty: 1 EACH | Refills: 3 | Status: SHIPPED | OUTPATIENT
Start: 2022-08-02

## 2022-08-02 RX ORDER — GLUCOSAMINE HCL/CHONDROITIN SU 500-400 MG
CAPSULE ORAL
Qty: 100 EACH | Refills: 3 | Status: SHIPPED | OUTPATIENT
Start: 2022-08-02

## 2022-08-02 RX ORDER — BUSPIRONE HYDROCHLORIDE 10 MG/1
10 TABLET ORAL 2 TIMES DAILY
Qty: 60 TABLET | Refills: 1 | Status: SHIPPED | OUTPATIENT
Start: 2022-08-02

## 2022-08-02 ASSESSMENT — ANXIETY QUESTIONNAIRES
GAD7 TOTAL SCORE: 8
7. FEELING AFRAID AS IF SOMETHING AWFUL MIGHT HAPPEN: SEVERAL DAYS
GAD7 TOTAL SCORE: 8
6. BECOMING EASILY ANNOYED OR IRRITABLE: MORE THAN HALF THE DAYS
2. NOT BEING ABLE TO STOP OR CONTROL WORRYING: SEVERAL DAYS
1. FEELING NERVOUS, ANXIOUS, OR ON EDGE: SEVERAL DAYS
GAD7 TOTAL SCORE: 8
4. TROUBLE RELAXING: SEVERAL DAYS
8. IF YOU CHECKED OFF ANY PROBLEMS, HOW DIFFICULT HAVE THESE MADE IT FOR YOU TO DO YOUR WORK, TAKE CARE OF THINGS AT HOME, OR GET ALONG WITH OTHER PEOPLE?: SOMEWHAT DIFFICULT
5. BEING SO RESTLESS THAT IT IS HARD TO SIT STILL: SEVERAL DAYS
3. WORRYING TOO MUCH ABOUT DIFFERENT THINGS: SEVERAL DAYS
7. FEELING AFRAID AS IF SOMETHING AWFUL MIGHT HAPPEN: SEVERAL DAYS
IF YOU CHECKED OFF ANY PROBLEMS ON THIS QUESTIONNAIRE, HOW DIFFICULT HAVE THESE PROBLEMS MADE IT FOR YOU TO DO YOUR WORK, TAKE CARE OF THINGS AT HOME, OR GET ALONG WITH OTHER PEOPLE: SOMEWHAT DIFFICULT

## 2022-08-02 NOTE — PROGRESS NOTES
Bryan was seen today for diabetes and medication problem.    Diagnoses and all orders for this visit:    Type 2 diabetes mellitus without complication, with long-term current use of insulin (H): He reports poor compliance with medication so far.  I recommend rechecking A1c today.  He states he will start setting alarm and I asked his wife to help him remember to take his medications as well.  He should be on metformin 1000 mg twice a day.  Close follow-up and will recheck A1c then.  Continue on lisinopril 5 mg.  He has been noncompliant with this as well.  Blood pressures okay today but with the diabetes and in the prehypertensive range 5 mg should do the trick.  We will get him a meter so he can start checking his blood sugars.  -     alcohol swab prep pads; Use to swab area of injection/samantha as directed  -     blood glucose (NO BRAND SPECIFIED) test strip; Use to test blood sugar daily. To accompany: Blood Glucose Monitor Brands: per insurance.  -     blood glucose calibration (NO BRAND SPECIFIED) solution; Use to calibrate blood glucose monitor as needed as directed. To accompany: Blood Glucose Monitor Brands: per insurance.  -     blood glucose monitoring (NO BRAND SPECIFIED) meter device kit; Use to test blood sugar daily or as directed.  -     thin (NO BRAND SPECIFIED) lancets; Use to test blood sugar daily or as directed. To accompany: Blood Glucose Monitor Brands: per insurance.    Generalized anxiety disorder: Poorly controlled.  Not taking Lexapro.  Very noncompliant with medications.  I think we need something that is going to allow him to see an effect sooner versus waiting the 4 to 6 weeks.  We will trial BuSpar instead.  Start at 10 mg twice a day and can increase dosage from there.  -     busPIRone (BUSPAR) 10 MG tablet; Take 1 tablet (10 mg) by mouth 2 times daily    Panic attack: Given how poorly controlled his anxiety is and his panic attacks I am willing to try some Xanax.  We discussed  appropriate use, abuse potential and I will only give him a short prescription to trial this out.  Did not do well on the hydroxyzine. needs close follow-up.  If continues with poor compliance with other medications I would consider him a poor candidate for benzodiazepines long term.  -     ALPRAZolam (XANAX) 0.25 MG tablet; Take 1 tablet (0.25 mg) by mouth 2 times daily as needed for anxiety    Diabetic ulcer of toe of right foot associated with type 2 diabetes mellitus, with muscle involvement without evidence of necrosis (H): He insists that should not be a diabetic ulcer because a big chunk of skin fell off his foot after his flip-flop fell off in [facility].  He states this was an injury.  I did discuss that it does look consistent with a diabetic ulcer and he needs to be very careful going forward.  At a bare minimum here it is likely to have poor healing.  He needs to get his diabetes under control and stressed the importance of this.  He has vascular follow-up in 2 days.        Jaydon Adams is a 32 year old accompanied by his self, presenting for the following health issues:  Diabetes and Medication Problem (Anxiety medication)      History of Present Illness       Mental Health Follow-up:  Patient presents to follow-up on Anxiety.    Patient's anxiety since last visit has been:  Worse  The patient is having other symptoms associated with anxiety.  Any significant life events: No  Patient is feeling anxious or having panic attacks.  Patient has no concerns about alcohol or drug use.    Diabetes:   He presents for follow up of diabetes.  He is checking home blood glucose a few times a week. He checks blood glucose before and after meals.  Blood glucose is sometimes over 200 and never under 70. He is aware of hypoglycemia symptoms including shakiness and blurred vision. He is concerned about blood sugar frequently over 200.  He is having numbness in feet. The patient has not had a diabetic eye exam in the  last 12 months.         Hypertension: He presents for follow up of hypertension.  He does not check blood pressure  regularly outside of the clinic. Outpatient blood pressures have not been over 140/90. He does not follow a low salt diet.    Today's DOUG-7 Score: 8     Messed up my toe.   Cut the skin off.  When they were in [place].  Seen in urgent care.  X-ray negative.  Called a diabetic ulcer.  Was sent to vascular has appointment in 2 days.  States should not be an ulcer as got the skin ripped off was an injury.    needs something else for anxiety.   panic attacks all the time  Hasn't been taking the Lexapro  Hasn't picked up the Augmentin yet for his toe  Hydroxyzine does not work.  Wife spoke with me when I saw her in clinic last week regarding situation as well stating that his symptoms are very out-of-control.  They were not able to have any fun on vacation because he just would not relax.  Hydroxyzine not helping.  Needs something else.    Review of Systems   Constitutional, HEENT, cardiovascular, pulmonary, gi and gu systems are negative, except as otherwise noted.      Objective    /85 (BP Location: Right arm, Patient Position: Sitting, Cuff Size: Adult Regular)   Pulse 77   Temp 98.3  F (36.8  C) (Oral)   Wt 101.6 kg (224 lb)   SpO2 98%   BMI 32.14 kg/m    Body mass index is 32.14 kg/m .  Physical Exam   GENERAL: alert and anxious and obese  RESP: No respiratory distress  CV: Acyanotic  SKIN: Plantar surface of right big toe there is about a 1 cm ulceration looks superficial.  No pus or surrounding erythema.  PSYCH: concentration poor, anxious and talking very quickly changing subjects.                    .  ..

## 2022-08-14 ENCOUNTER — APPOINTMENT (OUTPATIENT)
Dept: RADIOLOGY | Facility: HOSPITAL | Age: 32
End: 2022-08-14
Attending: EMERGENCY MEDICINE
Payer: COMMERCIAL

## 2022-08-14 ENCOUNTER — HOSPITAL ENCOUNTER (EMERGENCY)
Facility: HOSPITAL | Age: 32
Discharge: HOME | End: 2022-08-14
Attending: EMERGENCY MEDICINE | Admitting: EMERGENCY MEDICINE
Payer: COMMERCIAL

## 2022-08-14 VITALS
HEIGHT: 70 IN | WEIGHT: 220 LBS | BODY MASS INDEX: 31.5 KG/M2 | SYSTOLIC BLOOD PRESSURE: 118 MMHG | OXYGEN SATURATION: 95 % | TEMPERATURE: 98.1 F | DIASTOLIC BLOOD PRESSURE: 89 MMHG | RESPIRATION RATE: 18 BRPM | HEART RATE: 111 BPM

## 2022-08-14 VITALS
RESPIRATION RATE: 20 BRPM | HEART RATE: 87 BPM | SYSTOLIC BLOOD PRESSURE: 116 MMHG | DIASTOLIC BLOOD PRESSURE: 80 MMHG | WEIGHT: 220 LBS | BODY MASS INDEX: 31.57 KG/M2 | TEMPERATURE: 97.7 F | OXYGEN SATURATION: 94 %

## 2022-08-14 DIAGNOSIS — R73.9 HYPERGLYCEMIA: ICD-10-CM

## 2022-08-14 DIAGNOSIS — J40 BRONCHITIS: ICD-10-CM

## 2022-08-14 DIAGNOSIS — E87.20 LACTIC ACIDOSIS: ICD-10-CM

## 2022-08-14 LAB
ALBUMIN SERPL-MCNC: 4.4 G/DL (ref 3.5–5)
ALP SERPL-CCNC: 69 U/L (ref 45–120)
ALT SERPL W P-5'-P-CCNC: 38 U/L (ref 0–45)
ANION GAP SERPL CALCULATED.3IONS-SCNC: 14 MMOL/L (ref 5–18)
ANION GAP SERPL CALCULATED.3IONS-SCNC: 17 MMOL/L (ref 5–18)
AST SERPL W P-5'-P-CCNC: 17 U/L (ref 0–40)
ATRIAL RATE - MUSE: 104 BPM
BASE EXCESS BLDV CALC-SCNC: -2.1 MMOL/L
BILIRUB DIRECT SERPL-MCNC: <0.1 MG/DL
BILIRUB SERPL-MCNC: 0.3 MG/DL (ref 0–1)
BUN SERPL-MCNC: 21 MG/DL (ref 8–22)
BUN SERPL-MCNC: 23 MG/DL (ref 8–22)
CALCIUM SERPL-MCNC: 10.3 MG/DL (ref 8.5–10.5)
CALCIUM SERPL-MCNC: 10.9 MG/DL (ref 8.5–10.5)
CHLORIDE BLD-SCNC: 102 MMOL/L (ref 98–107)
CHLORIDE BLD-SCNC: 99 MMOL/L (ref 98–107)
CO2 SERPL-SCNC: 19 MMOL/L (ref 22–31)
CO2 SERPL-SCNC: 20 MMOL/L (ref 22–31)
CREAT SERPL-MCNC: 0.92 MG/DL (ref 0.7–1.3)
CREAT SERPL-MCNC: 1.31 MG/DL (ref 0.7–1.3)
DIASTOLIC BLOOD PRESSURE - MUSE: 79 MMHG
ERYTHROCYTE [DISTWIDTH] IN BLOOD BY AUTOMATED COUNT: 11.9 % (ref 10–15)
GFR SERPL CREATININE-BSD FRML MDRD: 75 ML/MIN/1.73M2
GFR SERPL CREATININE-BSD FRML MDRD: >90 ML/MIN/1.73M2
GLUCOSE BLD-MCNC: 275 MG/DL (ref 70–125)
GLUCOSE BLD-MCNC: 477 MG/DL (ref 70–125)
GLUCOSE BLDC GLUCOMTR-MCNC: 333 MG/DL (ref 70–99)
GLUCOSE BLDC GLUCOMTR-MCNC: 464 MG/DL (ref 70–99)
HCO3 BLDV-SCNC: 22 MMOL/L (ref 24–30)
HCT VFR BLD AUTO: 44.7 % (ref 40–53)
HGB BLD-MCNC: 15.6 G/DL (ref 13.3–17.7)
INTERPRETATION ECG - MUSE: NORMAL
KETONES BLD-SCNC: 0.39 MMOL/L
LACTATE SERPL-SCNC: 4.4 MMOL/L (ref 0.7–2)
LACTATE SERPL-SCNC: 5.7 MMOL/L (ref 0.7–2)
LIPASE SERPL-CCNC: 15 U/L (ref 0–52)
MCH RBC QN AUTO: 29.5 PG (ref 26.5–33)
MCHC RBC AUTO-ENTMCNC: 34.9 G/DL (ref 31.5–36.5)
MCV RBC AUTO: 85 FL (ref 78–100)
OXYHGB MFR BLDV: 76.5 % (ref 70–75)
P AXIS - MUSE: 55 DEGREES
PCO2 BLDV: 41 MM HG (ref 35–50)
PH BLDV: 7.37 [PH] (ref 7.35–7.45)
PLATELET # BLD AUTO: 272 10E3/UL (ref 150–450)
PO2 BLDV: 44 MM HG (ref 25–47)
POTASSIUM BLD-SCNC: 4.6 MMOL/L (ref 3.5–5)
POTASSIUM BLD-SCNC: 5.3 MMOL/L (ref 3.5–5)
PR INTERVAL - MUSE: 154 MS
PROT SERPL-MCNC: 7.4 G/DL (ref 6–8)
QRS DURATION - MUSE: 80 MS
QT - MUSE: 316 MS
QTC - MUSE: 415 MS
R AXIS - MUSE: 80 DEGREES
RBC # BLD AUTO: 5.29 10E6/UL (ref 4.4–5.9)
SAO2 % BLDV: 78.2 % (ref 70–75)
SODIUM SERPL-SCNC: 135 MMOL/L (ref 136–145)
SODIUM SERPL-SCNC: 136 MMOL/L (ref 136–145)
SYSTOLIC BLOOD PRESSURE - MUSE: 143 MMHG
T AXIS - MUSE: 46 DEGREES
TROPONIN I SERPL-MCNC: <0.01 NG/ML (ref 0–0.29)
VENTRICULAR RATE- MUSE: 104 BPM
WBC # BLD AUTO: 10.6 10E3/UL (ref 4–11)

## 2022-08-14 PROCEDURE — 82248 BILIRUBIN DIRECT: CPT | Performed by: EMERGENCY MEDICINE

## 2022-08-14 PROCEDURE — 93005 ELECTROCARDIOGRAM TRACING: CPT | Performed by: EMERGENCY MEDICINE

## 2022-08-14 PROCEDURE — 96360 HYDRATION IV INFUSION INIT: CPT

## 2022-08-14 PROCEDURE — 258N000003 HC RX IP 258 OP 636: Performed by: EMERGENCY MEDICINE

## 2022-08-14 PROCEDURE — 99283 EMERGENCY DEPT VISIT LOW MDM: CPT | Mod: 25

## 2022-08-14 PROCEDURE — 84484 ASSAY OF TROPONIN QUANT: CPT | Performed by: EMERGENCY MEDICINE

## 2022-08-14 PROCEDURE — 94640 AIRWAY INHALATION TREATMENT: CPT

## 2022-08-14 PROCEDURE — 85027 COMPLETE CBC AUTOMATED: CPT | Performed by: EMERGENCY MEDICINE

## 2022-08-14 PROCEDURE — 83690 ASSAY OF LIPASE: CPT | Performed by: EMERGENCY MEDICINE

## 2022-08-14 PROCEDURE — 80053 COMPREHEN METABOLIC PANEL: CPT | Performed by: EMERGENCY MEDICINE

## 2022-08-14 PROCEDURE — 250N000011 HC RX IP 250 OP 636: Performed by: EMERGENCY MEDICINE

## 2022-08-14 PROCEDURE — 83605 ASSAY OF LACTIC ACID: CPT | Performed by: EMERGENCY MEDICINE

## 2022-08-14 PROCEDURE — 96372 THER/PROPH/DIAG INJ SC/IM: CPT | Mod: 59 | Performed by: EMERGENCY MEDICINE

## 2022-08-14 PROCEDURE — 96361 HYDRATE IV INFUSION ADD-ON: CPT

## 2022-08-14 PROCEDURE — 82010 KETONE BODYS QUAN: CPT | Performed by: EMERGENCY MEDICINE

## 2022-08-14 PROCEDURE — 99285 EMERGENCY DEPT VISIT HI MDM: CPT | Mod: 25

## 2022-08-14 PROCEDURE — 36415 COLL VENOUS BLD VENIPUNCTURE: CPT | Performed by: EMERGENCY MEDICINE

## 2022-08-14 PROCEDURE — 82805 BLOOD GASES W/O2 SATURATION: CPT | Performed by: EMERGENCY MEDICINE

## 2022-08-14 PROCEDURE — 250N000009 HC RX 250: Performed by: EMERGENCY MEDICINE

## 2022-08-14 PROCEDURE — 71046 X-RAY EXAM CHEST 2 VIEWS: CPT

## 2022-08-14 PROCEDURE — 250N000012 HC RX MED GY IP 250 OP 636 PS 637: Performed by: EMERGENCY MEDICINE

## 2022-08-14 RX ORDER — IPRATROPIUM BROMIDE AND ALBUTEROL SULFATE 2.5; .5 MG/3ML; MG/3ML
3 SOLUTION RESPIRATORY (INHALATION) ONCE
Status: COMPLETED | OUTPATIENT
Start: 2022-08-14 | End: 2022-08-14

## 2022-08-14 RX ADMIN — DEXAMETHASONE 10 MG: 2 TABLET ORAL at 03:19

## 2022-08-14 RX ADMIN — IPRATROPIUM BROMIDE AND ALBUTEROL SULFATE 3 ML: .5; 3 SOLUTION RESPIRATORY (INHALATION) at 03:21

## 2022-08-14 RX ADMIN — INSULIN ASPART 6 UNITS: 100 INJECTION, SOLUTION INTRAVENOUS; SUBCUTANEOUS at 17:57

## 2022-08-14 RX ADMIN — SODIUM CHLORIDE 1000 ML: 9 INJECTION, SOLUTION INTRAVENOUS at 16:31

## 2022-08-14 ASSESSMENT — ACTIVITIES OF DAILY LIVING (ADL)
ADLS_ACUITY_SCORE: 35
ADLS_ACUITY_SCORE: 35
ADLS_ACUITY_SCORE: 33

## 2022-08-14 ASSESSMENT — ENCOUNTER SYMPTOMS
VOMITING: 0
ABDOMINAL PAIN: 0
FEVER: 0
NAUSEA: 0
SHORTNESS OF BREATH: 1
COUGH: 1

## 2022-08-14 NOTE — ED PROVIDER NOTES
Emergency Department Encounter     Evaluation Date & Time:   2022  3:53 PM    CHIEF COMPLAINT:  Hyperglycemia (Received steroid shot last night)      Triage Note:Reports elevated blood glucose after started talking steroids.             Impression and Plan       FINAL IMPRESSION:    ICD-10-CM    1. Hyperglycemia  R73.9    2. Lactic acidosis  E87.2          ED COURSE & MEDICAL DECISION MAKIN year old male, history of DM2 (on Metformin), HTN, HLD and anxiety, who presents for evaluation of hyperglycemia, which he thinks is secondary to a dose of Decadron administered at ED visit in the middle of the night for bronchitis. He takes Metformin only for his DM.    He reports BL lower lung discomfort secondary to his bronchitis with non-productive cough for the past couple of days. He reports shortness of breath, nearly daily, which he attributes to his anxiety. Denies fevers.    On exam, he has tachycardic rate with regular rhythm and clear lungs.    POCT glucose elevated to 464.    Patient placed on cardiac monitor, IV access established, blood sent for labs and IV fluids initiated.    EKG performed and demonstrated sinus tachycardia with increased T wave amplitude in the anterior leads and no ST-T wave elevation consistent with ACS or pericarditis; troponin WNL (<0.01).    CXR performed and was negative.    Labs remarkable for hyperglycemia (glucose 477) with mildly elevated ketones (0.39), however no associated acidosis or elevated anion gap to suggest DKA.  He does have acute renal insufficiency (creatinine 1.31 with BUN 23) and mild hyperkalemia (K+ 5.5) for which he was given IV fluids and 6 units insulin. After IV fluids, BMP was repeated with normalization of his potassium and creatinine with improvement in his serum glucose (275).     His lactate returned elevated to 5.7; after 1L IV fluids, lactate improved to 4.4. No leukocytosis, fevers with normal CXR and I do not suspect sepsis. Consider lactic  acidosis from his Metformin and patient advised to stop taking it and discuss this with his PCP.  Hepatic panel unremarkable and I doubt poor hepatic clearance.  I had ordered a full 2L of IV fluids, however he only received 1L and did not want to stay for the second liter.    Patient discharged to home with close follow-up with his primary care provider.  Return precautions provided.  Patient hemodynamically stable and in improved condition at time of disposition.      At the conclusion of the encounter I discussed the results of all the tests and the disposition. The questions were answered. The patient acknowledged understanding and was agreeable with the care plan.      MEDICATIONS GIVEN IN THE EMERGENCY DEPARTMENT:  Medications   0.9% sodium chloride BOLUS (0 mLs Intravenous Stopped 8/14/22 1924)   insulin aspart (NovoLOG) injection (RAPID ACTING) (6 Units Subcutaneous Given 8/14/22 1757)       NEW PRESCRIPTIONS STARTED AT TODAY'S ED VISIT:  Discharge Medication List as of 8/14/2022  8:41 PM          HPI     HPI     Bryan Grant is a 32 year old male, history of DM2 (on Metformin), HTN, HLD and anxiety, who presents to this ED by walk-in for evaluation of hyperglycemia.    Patient reports that he was given Decadron in the middle of the night when he was seen for bronchitis and his blood sugars are now elevated. He reports that his blood sugars typically are ~140s-240s. He takes Metformin only for his diabetes.    He also reports BL lower lung discomfort secondary to his bronchitis. He has had non-productive cough for the past couple of days. He reports shortness of breath, nearly daily, which he attributes to his anxiety. Denies fevers.    He has otherwise been in his usual state of health and denies chest pain, abdominal pain, N/V/D or other concerns.    On chart review, Bryan Grant was seen last night for symptoms of bronchitis.  He was diagnosed with bronchitis in [place] but was unable to  fill prescriptions because sent to pharmacy that was closed for the weekend.  He has been told that he has an obstructive lung condition, such as asthma but does not use medications regularly.  He was prescribed prednisone, albuterol and a Z-Chucky. No respiratory distress with O2 sats of 94%.  No significant wheezing or fever.  Patient given Decadron, DuoNeb and has prescription for antibiotics. Patient discharged to home with follow-up PCP.    REVIEW OF SYSTEMS:  All other systems reviewed and are negative.      Medical History     Past Medical History:   Diagnosis Date     Essential hypertension 2017     Hyperlipidemia 2017     Recurrent major depressive disorder (H) 2017     Shortness of breath 3/4/2015     Tobacco dependence syndrome 2017     Type 2 diabetes mellitus (H) 2017       Past Surgical History:   Procedure Laterality Date     OTHER SURGICAL HISTORY      arm surgery       Family History   Problem Relation Age of Onset     Cerebrovascular Disease Father         just  of massive stroke last week       Social History     Tobacco Use     Smoking status: Current Some Day Smoker     Last attempt to quit: 2019     Years since quitting: 3.6     Smokeless tobacco: Never Used   Substance Use Topics     Alcohol use: No     Drug use: No       alcohol swab prep pads  ALPRAZolam (XANAX) 0.25 MG tablet  ammonium lactate (LAC-HYDRIN) 12 % external lotion  blood glucose (NO BRAND SPECIFIED) test strip  blood glucose calibration (NO BRAND SPECIFIED) solution  blood glucose monitoring (NO BRAND SPECIFIED) meter device kit  busPIRone (BUSPAR) 10 MG tablet  lisinopril (ZESTRIL) 5 MG tablet  metFORMIN (GLUCOPHAGE) 1000 MG tablet  thin (NO BRAND SPECIFIED) lancets        Physical Exam     First Vitals:  Patient Vitals for the past 24 hrs:   BP Temp Temp src Pulse Resp SpO2 Height Weight   22 1615 118/89 -- -- 111 -- 95 % -- --   22 1537 123/70 98.1  F (36.7  C) Temporal 118 18 -- 1.778  "m (5' 10\") 99.8 kg (220 lb)       PHYSICAL EXAM:   Physical Exam    GENERAL: Awake, alert.  In no acute distress.   HEENT: Normocephalic, atraumatic. Pupils equal, round and reactive. Conjunctiva normal.   NECK: No stridor.  PULMONARY: Symmetrical breath sounds without distress.  Lungs clear to auscultation bilaterally without wheezes, rhonchi or rales.  CARDIO: Tachycardic rate with regular rhythm.  No significant murmur, rub or gallop.  Radial pulses strong and symmetrical.  ABDOMINAL: Abdomen soft, non-distended and non-tender to palpation.   EXTREMITIES: No lower extremity swelling or edema.      NEURO: Alert and oriented to person, place and time.  Cranial nerves grossly intact.  No focal motor deficit.  PSYCH: Normal mood and affect.  SKIN: No rashes.     Results     LAB:  All pertinent labs reviewed and interpreted  Labs Ordered and Resulted from Time of ED Arrival to Time of ED Departure   GLUCOSE BY METER - Abnormal       Result Value    GLUCOSE BY METER POCT 464 (*)    BASIC METABOLIC PANEL - Abnormal    Sodium 135 (*)     Potassium 5.3 (*)     Chloride 99      Carbon Dioxide (CO2) 19 (*)     Anion Gap 17      Urea Nitrogen 23 (*)     Creatinine 1.31 (*)     Calcium 10.9 (*)     Glucose 477 (*)     GFR Estimate 75     KETONE BETA-HYDROXYBUTYRATE QUANTITATIVE, RAPID - Abnormal    Ketone (Beta-Hydroxybutyrate) Quantitative 0.39 (*)    BLOOD GAS VENOUS - Abnormal    pH Venous 7.37      pCO2 Venous 41      pO2 Venous 44      Bicarbonate Venous 22 (*)     Base Excess/Deficit (+/-) -2.1      Oxyhemoglobin Venous 76.5 (*)     O2 Sat, Venous 78.2 (*)    LACTIC ACID WHOLE BLOOD - Abnormal    Lactic Acid 5.7 (*)    GLUCOSE BY METER - Abnormal    GLUCOSE BY METER POCT 333 (*)    BASIC METABOLIC PANEL - Abnormal    Sodium 136      Potassium 4.6      Chloride 102      Carbon Dioxide (CO2) 20 (*)     Anion Gap 14      Urea Nitrogen 21      Creatinine 0.92      Calcium 10.3      Glucose 275 (*)     GFR Estimate >90   "   LACTIC ACID WHOLE BLOOD - Abnormal    Lactic Acid 4.4 (*)    CBC WITH PLATELETS - Normal    WBC Count 10.6      RBC Count 5.29      Hemoglobin 15.6      Hematocrit 44.7      MCV 85      MCH 29.5      MCHC 34.9      RDW 11.9      Platelet Count 272     TROPONIN I - Normal    Troponin I <0.01     HEPATIC FUNCTION PANEL - Normal    Bilirubin Total 0.3      Bilirubin Direct <0.1      Protein Total 7.4      Albumin 4.4      Alkaline Phosphatase 69      AST 17      ALT 38     LIPASE - Normal    Lipase 15         RADIOLOGY:  Chest XR,  PA & LAT   Final Result   IMPRESSION: Negative chest. No change.          EC2022, 17:02; sinus tachycardia with rate of 104 bpm; normal intervals; normal conduction; increased T wave amplitude anterior leads with no ST-T wave elevation consistent with ACS or pericarditis; no previous EKG available for comparison    EKG independently reviewed and interpreted by MD Lizette Frederick MD  Emergency Medicine  [facility] EMERGENCY DEPARTMENT           Lizette Talavera MD  08/15/22 5628

## 2022-08-14 NOTE — ED NOTES
Pt presents NAD. SKIN: NWD.   HEENT: normocephalic, PERRL, clear x 3.   CHEST: symmetrical rise, CEBBS, no CP or SOB. Pt c/o dry cough.  ABD: NTND, no N&V or diarrhea.  EXT: CATALAN x 4  Rest of exam unremarkable.

## 2022-08-14 NOTE — Clinical Note
Bryan Grant was seen and treated in our emergency department on 8/14/2022.  He may return to work on 08/16/2022.  If unable to return to work at this time, will need additional testing and evaluation in clinic.     If you have any questions or concerns, please don't hesitate to call.      Karen Paula MD

## 2022-08-14 NOTE — DISCHARGE INSTRUCTIONS
You were given a dose of steroids and a breathing treatment in the emergency department.  I do feel your symptoms are representative of acute bronchitis.  Please fill the prescriptions given to you at the urgent care except for the prednisone, for which you received an alternative here (decadron).    Follow-up in clinic if you are not getting better in the next 5 to 7 days

## 2022-08-14 NOTE — ED PROVIDER NOTES
EMERGENCY DEPARTMENT ENCOUNTER      NAME: Bryan Grant  AGE: 32 year old male  YOB: 1990  MRN: [id number]  EVALUATION DATE & TIME: 8/14/2022  2:29 AM    PCP: No Ref-Primary, Physician    ED PROVIDER: Karen Paula M.D.      No chief complaint on file.        FINAL IMPRESSION:  1. Bronchitis        MEDICAL DECISION MAKING:    3:03 AM I met with the patient, obtained history, performed an initial exam, and discussed options and plan for diagnostics and treatment here in the ED.   3:49 AM I rechecked and updated patient. We discussed the plan for discharge and the patient is agreeable. Reviewed supportive cares, symptomatic treatment, outpatient follow up, and reasons to return to the Emergency Department. Patient to be discharged by ED RN.     Pertinent Labs & Imaging studies reviewed. (See chart for details)     Bryan Grant is a 32 year old male who presents with symptoms of bronchitis.  Was diagnosed with bronchitis up in [place] but was unable to fill prescriptions because sent to pharmacy that was closed for the weekend.  He is an occasional smoker.  Has been told that he has obstructive lung conditions such as asthma but does not use medications regularly.  He was prescribed prednisone, albuterol and a Z-Chucky.  He is in no respiratory distress.  SPO2 is 94%.  No significant wheezing or fever.  Otherwise we will treat as bronchitis including Decadron, DuoNeb and antibiotics.     Feeling much better after the steroids and DuoNeb's.  He did already receive a prescription for azithromycin so I feel he is okay to wait until Monday morning to receive this prescription.  We discussed warning signs and indications to return to the emergency department.  He understands these and all discharge instructions.      MEDICATIONS GIVEN IN THE EMERGENCY:  Medications   dexamethasone (DECADRON) tablet 10 mg (10 mg Oral Given 8/14/22 0319)   ipratropium - albuterol 0.5 mg/2.5 mg/3 mL (DUONEB) neb  "solution 3 mL (3 mLs Nebulization Given 8/14/22 0321)         =================================================================    HPI    Patient information was obtained from: Patient    Use of : N/A       Bryan Grant is a 32 year old male with a history of HTN, hyperlipidemia, DM2, anxiety, tobacco dependence syndrome, class 1 obesity, who presents via walk-in with cough and shortness of breath.    Patient reports shortness of breath and cough that started a couple days ago. He states he is from [facility] so he was seen at an Urgent Care in [place] for his symptoms. At that time, he had a COVID test done and was prescribed Prednisone, Z-pack, and an inhaler. Patient was unable to  his prescription afterwards because the pharmacy his prescription was sent to had closed so he is unable to  medications until tomorrow. Patient reports his symptoms were a little worse tonight, which prompted him to be seen in the ED. He notes some associating bilateral lower chest pain. No prior history of asthma or lung disease. He states that he has had inhaler in the past and was told that his \"lungs are longer.\" Otherwise, he denies any fevers, nausea, vomiting, and abdominal pain. Patient does vape and notes he quit smoking cigarettes a couple weeks ago. No other complaints at this time.    REVIEW OF SYSTEMS   Review of Systems   Constitutional: Negative for fever.   Respiratory: Positive for cough and shortness of breath.    Cardiovascular: Positive for chest pain (bilateral lower).   Gastrointestinal: Negative for abdominal pain, nausea and vomiting.   All other systems reviewed and are negative.     PAST MEDICAL HISTORY:  Past Medical History:   Diagnosis Date     Essential hypertension 2/6/2017    Overview:  Hypertension (HTN) NOS Overview:  Overview:  Hypertension (HTN) NOS      Hyperlipidemia 2/27/2017    Overview:  Previously declined intervention. Overview:  Overview:  Previously " declined intervention.      Recurrent major depressive disorder (H) 11/14/2017    Overview:  Generalized anxiety disorder, panic disorder, Major depressive disorder,  recurrent, moderate, Rule out personality disorder per Psych. Zoloft 100  mg 1 tablet daily and Ativan 0.5 mg 1 tablet up to 2 times a day as needed  for episodes of severe anxiety or panic attacks prescribed by psych.   Continue to follow with psych/Behavioral Health.    Overview:  Overview:  Generalized anxiety      Shortness of breath 3/4/2015     Tobacco dependence syndrome 2/6/2017     Type 2 diabetes mellitus (H) 2/6/2017    Overview:  Diabetes Mellitus Type 2 Overview:  Overview:  Diabetes Mellitus Type 2        PAST SURGICAL HISTORY:  Past Surgical History:   Procedure Laterality Date     OTHER SURGICAL HISTORY      arm surgery       CURRENT MEDICATIONS:    No current facility-administered medications for this encounter.    Current Outpatient Medications:      alcohol swab prep pads, Use to swab area of injection/samantha as directed, Disp: 100 each, Rfl: 3     ALPRAZolam (XANAX) 0.25 MG tablet, Take 1 tablet (0.25 mg) by mouth 2 times daily as needed for anxiety, Disp: 30 tablet, Rfl: 0     ammonium lactate (LAC-HYDRIN) 12 % external lotion, Apply topically 2 times daily, Disp: 222 mL, Rfl: 1     blood glucose (NO BRAND SPECIFIED) test strip, Use to test blood sugar daily. To accompany: Blood Glucose Monitor Brands: per insurance., Disp: 100 strip, Rfl: 6     blood glucose calibration (NO BRAND SPECIFIED) solution, Use to calibrate blood glucose monitor as needed as directed. To accompany: Blood Glucose Monitor Brands: per insurance., Disp: 1 each, Rfl: 0     blood glucose monitoring (NO BRAND SPECIFIED) meter device kit, Use to test blood sugar daily or as directed., Disp: 1 kit, Rfl: 0     busPIRone (BUSPAR) 10 MG tablet, Take 1 tablet (10 mg) by mouth 2 times daily, Disp: 60 tablet, Rfl: 1     lisinopril (ZESTRIL) 5 MG tablet, Take 1 tablet (5  mg) by mouth daily, Disp: 90 tablet, Rfl: 1     metFORMIN (GLUCOPHAGE) 1000 MG tablet, Take 2,000 mg by mouth, Disp: , Rfl:      thin (NO BRAND SPECIFIED) lancets, Use to test blood sugar daily or as directed. To accompany: Blood Glucose Monitor Brands: per insurance., Disp: 1 each, Rfl: 3    ALLERGIES:  Allergies   Allergen Reactions     Erythromycin-Sulfisoxazole Unknown       FAMILY HISTORY:  Family History   Problem Relation Age of Onset     Cerebrovascular Disease Father         just  of massive stroke last week       SOCIAL HISTORY:   Social History     Tobacco Use     Smoking status: Current Some Day Smoker     Last attempt to quit: 2019     Years since quitting: 3.6     Smokeless tobacco: Never Used   Substance Use Topics     Alcohol use: No     Drug use: No        PHYSICAL EXAM:    Vitals: /80   Pulse 83   Temp 97.7  F (36.5  C) (Temporal)   Resp 20   Wt 99.8 kg (220 lb)   SpO2 99%   BMI 31.57 kg/m     General:. Alert and interactive, comfortable appearing.  HENT: Oropharynx without erythema or exudates. MMM.  TMs clear bilaterally.  Eyes: Pupils mid-sized and equally reactive.   Neck: Full AROM.  No midline tenderness to palpation.  Cardiovascular: Regular rate and rhythm. Peripheral pulses 2+ bilaterally.  Chest/Pulmonary: Normal work of breathing. Lung sounds clear and equal throughout, no wheezes or crackles. No chest wall tenderness or deformities.  Abdomen: Soft, nondistended. Nontender without guarding or rebound.  Back/Spine: No CVA or midline tenderness.  Extremities: Normal ROM of all major joints. No lower extremity edema.   Skin: Warm and dry. Normal skin color.   Neuro: Speech clear. CNs grossly intact. Moves all extremities appropriately. Strength and sensation grossly intact to all extremities.   Psych: Normal affect/mood, cooperative, memory appropriate.           IMari, am serving as a scribe to document services personally performed by Dr. Karen Paula  based  on my observation and the provider's statements to me. I, Karen Paula MD attest that Mari Sarai is acting in a scribe capacity, has observed my performance of the services and has documented them in accordance with my direction.      Karen Paula M.D.  Emergency Medicine  [facility] EMERGENCY DEPARTMENT  [address] [place] [id number]  [phone]  Dept: [phone]         Karen Paula MD  08/14/22 0724

## 2022-08-14 NOTE — ED TRIAGE NOTES
Pt states he was seen for URI, put on prednisone, inhaler and zpac.  Pt did not pick them up.  Pt states his breathing felt worse tonight.  Pt states he lives up [place], his anxiety      Triage Assessment     Row Name 08/14/22 0224       Triage Assessment (Adult)    Airway WDL WDL       Respiratory WDL    Respiratory WDL X       Skin Circulation/Temperature WDL    Skin Circulation/Temperature WDL WDL       Cardiac WDL    Cardiac WDL WDL       Peripheral/Neurovascular WDL    Peripheral Neurovascular WDL WDL       Cognitive/Neuro/Behavioral WDL    Cognitive/Neuro/Behavioral WDL WDL

## 2022-08-15 ENCOUNTER — MYC MEDICAL ADVICE (OUTPATIENT)
Dept: FAMILY MEDICINE | Facility: CLINIC | Age: 32
End: 2022-08-15

## 2022-08-15 DIAGNOSIS — Z79.4 TYPE 2 DIABETES MELLITUS WITHOUT COMPLICATION, WITH LONG-TERM CURRENT USE OF INSULIN (H): ICD-10-CM

## 2022-08-15 DIAGNOSIS — E11.9 TYPE 2 DIABETES MELLITUS WITHOUT COMPLICATION, WITH LONG-TERM CURRENT USE OF INSULIN (H): ICD-10-CM

## 2022-08-15 DIAGNOSIS — E11.9 TYPE 2 DIABETES MELLITUS WITHOUT COMPLICATION, WITHOUT LONG-TERM CURRENT USE OF INSULIN (H): Primary | ICD-10-CM

## 2022-08-15 NOTE — ED NOTES
"Pt IV removed, pt came out of room before primary RN had discharge paperwork and stated \"I don't have time to play the waiting game\" and left.   "

## 2022-08-15 NOTE — DISCHARGE INSTRUCTIONS
Please follow-up with your primary care provider within 3 days for a recheck; call to arrange appointment.    Return to the ER for blood sugars persistently elevated >400, muscle pain, persistent vomiting, fever or other concerns.    I recommend that you stop taking the metformin as this can cause a lactic acidosis and discuss further treatment for your diabetes with your primary care provider this week.

## 2022-08-17 RX ORDER — INSULIN LISPRO 100 [IU]/ML
3 INJECTION, SOLUTION INTRAVENOUS; SUBCUTANEOUS
Qty: 15 ML | Refills: 0 | Status: SHIPPED | OUTPATIENT
Start: 2022-08-17 | End: 2022-10-14

## 2022-08-17 RX ORDER — INSULIN GLARGINE 100 [IU]/ML
10 INJECTION, SOLUTION SUBCUTANEOUS AT BEDTIME
Qty: 15 ML | Refills: 1 | Status: SHIPPED | OUTPATIENT
Start: 2022-08-17

## 2022-08-22 ENCOUNTER — VIRTUAL VISIT (OUTPATIENT)
Dept: FAMILY MEDICINE | Facility: CLINIC | Age: 32
End: 2022-08-22
Payer: COMMERCIAL

## 2022-08-22 DIAGNOSIS — F41.0 PANIC ATTACK: ICD-10-CM

## 2022-08-22 DIAGNOSIS — E11.65 TYPE 2 DIABETES MELLITUS WITH HYPERGLYCEMIA, WITHOUT LONG-TERM CURRENT USE OF INSULIN (H): Primary | ICD-10-CM

## 2022-08-22 DIAGNOSIS — F41.1 GENERALIZED ANXIETY DISORDER: ICD-10-CM

## 2022-08-22 PROCEDURE — 99213 OFFICE O/P EST LOW 20 MIN: CPT | Mod: GT | Performed by: FAMILY MEDICINE

## 2022-08-22 RX ORDER — HYDROXYZINE HYDROCHLORIDE 25 MG/1
25-50 TABLET, FILM COATED ORAL 3 TIMES DAILY PRN
Qty: 60 TABLET | Refills: 0 | Status: SHIPPED | OUTPATIENT
Start: 2022-08-22 | End: 2022-08-22

## 2022-08-22 RX ORDER — HYDROXYZINE HYDROCHLORIDE 25 MG/1
25-50 TABLET, FILM COATED ORAL 3 TIMES DAILY PRN
Qty: 60 TABLET | Refills: 0 | Status: SHIPPED | OUTPATIENT
Start: 2022-08-22 | End: 2022-10-14

## 2022-08-22 ASSESSMENT — ANXIETY QUESTIONNAIRES
GAD7 TOTAL SCORE: 11
5. BEING SO RESTLESS THAT IT IS HARD TO SIT STILL: SEVERAL DAYS
6. BECOMING EASILY ANNOYED OR IRRITABLE: NEARLY EVERY DAY
1. FEELING NERVOUS, ANXIOUS, OR ON EDGE: NEARLY EVERY DAY
GAD7 TOTAL SCORE: 11
IF YOU CHECKED OFF ANY PROBLEMS ON THIS QUESTIONNAIRE, HOW DIFFICULT HAVE THESE PROBLEMS MADE IT FOR YOU TO DO YOUR WORK, TAKE CARE OF THINGS AT HOME, OR GET ALONG WITH OTHER PEOPLE: SOMEWHAT DIFFICULT
3. WORRYING TOO MUCH ABOUT DIFFERENT THINGS: SEVERAL DAYS
2. NOT BEING ABLE TO STOP OR CONTROL WORRYING: SEVERAL DAYS
7. FEELING AFRAID AS IF SOMETHING AWFUL MIGHT HAPPEN: SEVERAL DAYS

## 2022-08-22 ASSESSMENT — PATIENT HEALTH QUESTIONNAIRE - PHQ9
SUM OF ALL RESPONSES TO PHQ QUESTIONS 1-9: 7
5. POOR APPETITE OR OVEREATING: SEVERAL DAYS

## 2022-08-22 NOTE — PROGRESS NOTES
"Dylan is a 32 year old who is being evaluated via a billable video visit.      How would you like to obtain your AVS? [employer]  If the video visit is dropped, the invitation should be resent by: Text to cell phone: [phone]  Will anyone else be joining your video visit? No        Bryan was seen today for depression.    Diagnoses and all orders for this visit:    Type 2 diabetes mellitus with hyperglycemia, without long-term current use of insulin (H): Would like referral for eye exam.  Cannot remember if referral was placed he has not received a call yet.  Blood sugars remain elevated as below.  We have just started on insulin with 10 units Lantus nightly and 3 units with meals.  He will continue to monitor and update me via [employer].  We discussed making the switch to 12 units Lantus evening and 4 to 5 units with meals soon.  He was agreeable to plan.  -     Adult Eye  Referral; Future    Generalized anxiety disorder: He has not been able to be consistent first with the Lexapro and now with the buspirone.  States that the buspirone put him in a fog this week.  I do not think he is going to be compliant with a daily medication.  I discussed options of therapy and seeing psychiatry.  He does not feel like he will succeed with therapy as does not like to talk.  He does not want to see psychiatrist because he does not want to be labeled as \"crazy \".  We will hold off on those referrals for now but something to consider in the future.  I am just concerned I will try different medications and he he will not take them.  So for now discussed to continue with the as needed Atarax as does seem to be providing some help for him.  Also became more clear today that he is always struggled with anxiety but more recently its been worse because of recent events including baby being in the NICU, moving further away from the [facility] [place] and his elevated blood sugars.  -     hydrOXYzine (ATARAX) 25 MG tablet; Take 1-2 " tablets (25-50 mg) by mouth 3 times daily as needed for anxiety    Panic attack: Discussed with patient he is not unique and his symptoms are consistent with a panic attack.  And that is where the hydroxyzine or Xanax can be useful.  He does not want take the Xanax unless absolutely necessary so he is actually gone back to the Atarax.  He is wondering about increasing the dose and discussed okay to go up to 50 mg.  We will change prescription for him.  Follow-up in a few weeks via [facility].  -     hydrOXYzine (ATARAX) 25 MG tablet; Take 1-2 tablets (25-50 mg) by mouth 3 times daily as needed for anxiety          Subjective   Dylan is a 32 year old accompanied by his partner, presenting for the following health issues:  Depression      HPI   Buspirone. Put me in a fog for a week. Didn't have anxiety yesterday or today. I just came out of it.   Started feeling really off. Arms and legs felt werid. Didn't feel normal.  When I get the anxiety feels like I can't breath.   Joking about filing for disability.   Does really affect my life.    If I go for a drive. I feel better.   Get some fresh air.  Discussed therapy a little bit but patient really does not like to talk and does not think it will be helpful for him.    Blood sugars: 336 in the evneing . Bed time 266  19th.   7 day aver is 263  14 day average is 258.   8/20 192  166 on 8/20 at 806  on the 14th    Trying to not use the Xanax at all because I had mentioned people can get addicted to it.  So he has not tried it yet.  Gets SOB with anxiety is that normal?  Thinks that anxiety worse recently due to elevated blood sugars. Moving, baby in the nicu  Has had anixety. My whole life but just got it back a little bit ago  comes in spurts  Hasn't taken the xanax. Hasn't had itbad enough to take it  Has taken the hydroxyzine and may be helpign a little bit now.   Okay to increase to 50mg.   Doesn't want to take the xanax unless absolutely have to.       Depression  and Anxiety Follow-Up    How are you doing with your depression since your last visit? No change    How are you doing with your anxiety since your last visit?  No change    Are you having other symptoms that might be associated with depression or anxiety? No    Have you had a significant life event? No     Do you have any concerns with your use of alcohol or other drugs? No    Social History     Tobacco Use     Smoking status: Former Smoker     Quit date: 1/1/2019     Years since quitting: 3.6     Smokeless tobacco: Never Used   Vaping Use     Vaping Use: Every day     Substances: Nicotine     Devices: Disposable   Substance Use Topics     Alcohol use: No     Drug use: No     PHQ 4/26/2022 6/14/2022 8/22/2022   PHQ-9 Total Score 4 - 7   Q9: Thoughts of better off dead/self-harm past 2 weeks Not at all Not at all Not at all     DOUG-7 SCORE 6/14/2022 8/2/2022 8/22/2022   Total Score - 8 (mild anxiety) -   Total Score 8 8 11     Last PHQ-9 8/22/2022   1.  Little interest or pleasure in doing things 2   2.  Feeling down, depressed, or hopeless 0   3.  Trouble falling or staying asleep, or sleeping too much 1   4.  Feeling tired or having little energy 3   5.  Poor appetite or overeating 0   6.  Feeling bad about yourself 0   7.  Trouble concentrating 1   8.  Moving slowly or restless 0   Q9: Thoughts of better off dead/self-harm past 2 weeks 0   PHQ-9 Total Score 7   Difficulty at work, home, or with people Somewhat difficult     DOUG-7  8/22/2022   1. Feeling nervous, anxious, or on edge 3   2. Not being able to stop or control worrying 1   3. Worrying too much about different things 1   4. Trouble relaxing 1   5. Being so restless that it is hard to sit still 1   6. Becoming easily annoyed or irritable 3   7. Feeling afraid, as if something awful might happen 1   DOUG-7 Total Score 11   If you checked any problems, how difficult have they made it for you to do your work, take care of things at home, or get along with  "other people? Somewhat difficult       Suicide Assessment Five-step Evaluation and Treatment (SAFE-T)              Review of Systems   Constitutional, HEENT, cardiovascular, pulmonary, gi and gu systems are negative, except as otherwise noted.      Objective    Vitals - Patient Reported  Weight (Patient Reported): 99.8 kg (220 lb)  Height (Patient Reported): 177.8 cm (5' 10\")  BMI (Based on Pt Reported Ht/Wt): 31.57        Physical Exam   GENERAL: Healthy, alert and no distress  EYES: Eyes grossly normal to inspection.  No discharge or erythema, or obvious scleral/conjunctival abnormalities.  RESP: No audible wheeze, cough, or visible cyanosis.  No visible retractions or increased work of breathing.    SKIN: Visible skin clear. No significant rash, abnormal pigmentation or lesions.  PSYCH: Mentation appears normal, affect normal/bright, judgement and insight intact, normal speech.  He does seem a bit on edge and changes subjects frequently.                Video-Visit Details    Video Start Time: 5:16PM    Type of service:  Video Visit    Video End Time:5:39 PM    Originating Location (pt. Location): Other Seems to be in a store parking lot/car    Distant Location (provider location):  [facility]     Platform used for Video Visit: Jackelin    .  ..  "

## 2022-08-28 PROCEDURE — 99284 EMERGENCY DEPT VISIT MOD MDM: CPT

## 2022-08-29 ENCOUNTER — HOSPITAL ENCOUNTER (EMERGENCY)
Facility: HOSPITAL | Age: 32
Discharge: HOME | End: 2022-08-29
Attending: EMERGENCY MEDICINE | Admitting: EMERGENCY MEDICINE
Payer: COMMERCIAL

## 2022-08-29 ENCOUNTER — APPOINTMENT (OUTPATIENT)
Dept: RADIOLOGY | Facility: HOSPITAL | Age: 32
End: 2022-08-29
Attending: EMERGENCY MEDICINE
Payer: COMMERCIAL

## 2022-08-29 VITALS
TEMPERATURE: 98 F | BODY MASS INDEX: 31.5 KG/M2 | HEART RATE: 81 BPM | HEIGHT: 70 IN | RESPIRATION RATE: 16 BRPM | DIASTOLIC BLOOD PRESSURE: 92 MMHG | SYSTOLIC BLOOD PRESSURE: 151 MMHG | WEIGHT: 220 LBS | OXYGEN SATURATION: 97 %

## 2022-08-29 DIAGNOSIS — R06.02 SHORTNESS OF BREATH: ICD-10-CM

## 2022-08-29 LAB
ANION GAP SERPL CALCULATED.3IONS-SCNC: 16 MMOL/L (ref 5–18)
BASOPHILS # BLD AUTO: 0 10E3/UL (ref 0–0.2)
BASOPHILS NFR BLD AUTO: 1 %
BUN SERPL-MCNC: 13 MG/DL (ref 8–22)
CALCIUM SERPL-MCNC: 9.6 MG/DL (ref 8.5–10.5)
CHLORIDE BLD-SCNC: 105 MMOL/L (ref 98–107)
CO2 SERPL-SCNC: 18 MMOL/L (ref 22–31)
CREAT SERPL-MCNC: 0.88 MG/DL (ref 0.7–1.3)
EOSINOPHIL # BLD AUTO: 0.1 10E3/UL (ref 0–0.7)
EOSINOPHIL NFR BLD AUTO: 1 %
ERYTHROCYTE [DISTWIDTH] IN BLOOD BY AUTOMATED COUNT: 11.9 % (ref 10–15)
FLUAV RNA SPEC QL NAA+PROBE: NEGATIVE
FLUBV RNA RESP QL NAA+PROBE: NEGATIVE
GFR SERPL CREATININE-BSD FRML MDRD: >90 ML/MIN/1.73M2
GLUCOSE BLD-MCNC: 214 MG/DL (ref 70–125)
GLUCOSE BLDC GLUCOMTR-MCNC: 283 MG/DL (ref 70–99)
HCT VFR BLD AUTO: 41 % (ref 40–53)
HGB BLD-MCNC: 14.7 G/DL (ref 13.3–17.7)
IMM GRANULOCYTES # BLD: 0 10E3/UL
IMM GRANULOCYTES NFR BLD: 0 %
LYMPHOCYTES # BLD AUTO: 3.5 10E3/UL (ref 0.8–5.3)
LYMPHOCYTES NFR BLD AUTO: 53 %
MCH RBC QN AUTO: 30 PG (ref 26.5–33)
MCHC RBC AUTO-ENTMCNC: 35.9 G/DL (ref 31.5–36.5)
MCV RBC AUTO: 84 FL (ref 78–100)
MONOCYTES # BLD AUTO: 0.6 10E3/UL (ref 0–1.3)
MONOCYTES NFR BLD AUTO: 9 %
NEUTROPHILS # BLD AUTO: 2.3 10E3/UL (ref 1.6–8.3)
NEUTROPHILS NFR BLD AUTO: 36 %
NRBC # BLD AUTO: 0 10E3/UL
NRBC BLD AUTO-RTO: 0 /100
PLATELET # BLD AUTO: 244 10E3/UL (ref 150–450)
POTASSIUM BLD-SCNC: 4 MMOL/L (ref 3.5–5)
RBC # BLD AUTO: 4.9 10E6/UL (ref 4.4–5.9)
RSV RNA SPEC NAA+PROBE: NEGATIVE
SARS-COV-2 RNA RESP QL NAA+PROBE: NEGATIVE
SODIUM SERPL-SCNC: 139 MMOL/L (ref 136–145)
WBC # BLD AUTO: 6.5 10E3/UL (ref 4–11)

## 2022-08-29 PROCEDURE — 71046 X-RAY EXAM CHEST 2 VIEWS: CPT

## 2022-08-29 PROCEDURE — 85018 HEMOGLOBIN: CPT | Performed by: EMERGENCY MEDICINE

## 2022-08-29 PROCEDURE — 80048 BASIC METABOLIC PNL TOTAL CA: CPT | Performed by: EMERGENCY MEDICINE

## 2022-08-29 PROCEDURE — C9803 HOPD COVID-19 SPEC COLLECT: HCPCS

## 2022-08-29 PROCEDURE — 36415 COLL VENOUS BLD VENIPUNCTURE: CPT | Performed by: EMERGENCY MEDICINE

## 2022-08-29 PROCEDURE — 87637 SARSCOV2&INF A&B&RSV AMP PRB: CPT | Performed by: EMERGENCY MEDICINE

## 2022-08-29 ASSESSMENT — ENCOUNTER SYMPTOMS
COUGH: 1
SHORTNESS OF BREATH: 1

## 2022-08-29 ASSESSMENT — ACTIVITIES OF DAILY LIVING (ADL): ADLS_ACUITY_SCORE: 35

## 2022-08-29 NOTE — ED TRIAGE NOTES
"Patient c/o feels short of breath; stated \" I took anxiety pill and cough up powdery stuff \" drank water.  Currently smoker and uses Vapes.  Denied pain chest pain.       "

## 2022-08-29 NOTE — ED PROVIDER NOTES
EMERGENCY DEPARTMENT ENCOUNTER      NAME: Bryan Grant  AGE: 32 year old male  YOB: 1990  MRN: [id number]  EVALUATION DATE & TIME: No admission date for patient encounter.    PCP: No Ref-Primary, Physician    ED PROVIDER: Rakan Brantley M.D.      Chief Complaint   Patient presents with     Shortness of Breath     Cough         FINAL IMPRESSION:  1. Shortness of breath          ED COURSE & MEDICAL DECISION MAKING:    Pertinent Labs & Imaging studies reviewed. (See chart for details)  32 year old male presents to the Emergency Department for evaluation of dyspnea.  Patient's been having dyspnea for quite some time.  Has been treated recently for bronchitis and possible asthma.  Does have a history of diabetes.  Blood sugar is up slightly here.  No signs of DKA.  Chest x-ray is clear.  I do not think this is PE.  Would not pursue this further.  COVID influenza are negative.  White count is normal.  Patient does vape and I think this might be contributing to his shortness of breath.  Did advise him to stop this.  He will follow-up with his primary.  Return for worsening symptoms.  Oxygen saturation is normal here.  No signs of cardiac disease.    12:58 AM I met with the patient to gather history and to perform my initial exam. I discussed the plan for care while in the Emergency Department. PPE: surgical mask, surgical gloves, eye protection.    At the conclusion of the encounter I discussed the results of all of the tests and the disposition. The questions were answered. The patient or family acknowledged understanding and was agreeable with the care plan.       0 minutes of critical care time     MEDICATIONS GIVEN IN THE EMERGENCY:  Medications - No data to display    NEW PRESCRIPTIONS STARTED AT TODAY'S ER VISIT  Discharge Medication List as of 8/29/2022  3:13 AM             =================================================================    HPI    Patient information was obtained from:  "Patient    Use of : N/A        Bryan Grant is a 32 year old male with a pertinent history of hypertension, anxiety, diabetes mellitus type 2 on metformin, tobacco dependence who presents to this ED by walk in for evaluation of shortness of breath. Patient endorses a dry cough and shortness of breath for the past couple of weeks stating he feels \"off\". He was prescribed an inhaler without any improvement to his symptoms. He notes feeling subjectively febrile prior to arrival. Of note, patient also states his eyes feel \"off\", and does have a follow up schedule for evaluation of this.    Patient denies leg pain or swelling.    SHx- Patient vapes daily.    Per chart review, patient was seen at this ED on 08/14/22. Patient reported a couple days of shortness of breath and a cough. And was discharged with decadron and an albuterol inhaler. Patient later returned to the ED with hyperglycemia following steroid use. Most notably, patient had an elevated lactic acid at 5.7 which improved to 4.7 with fluids. Initial blood sugar was 464 which improved to 275 with insulin. CXR was negative.    REVIEW OF SYSTEMS   Review of Systems   Constitutional:        Positive for feeling \"off\".   Eyes:        Positive for eyes feeling \"off\" (ongoing).   Respiratory: Positive for cough (dry) and shortness of breath.    Cardiovascular: Negative for leg swelling.   Musculoskeletal:        Denies leg pain.   All other systems reviewed and are negative.       PAST MEDICAL HISTORY:  Past Medical History:   Diagnosis Date     Essential hypertension 2/6/2017    Overview:  Hypertension (HTN) NOS Overview:  Overview:  Hypertension (HTN) NOS      Hyperlipidemia 2/27/2017    Overview:  Previously declined intervention. Overview:  Overview:  Previously declined intervention.      Recurrent major depressive disorder (H) 11/14/2017    Overview:  Generalized anxiety disorder, panic disorder, Major depressive disorder,  recurrent, " moderate, Rule out personality disorder per Psych. Zoloft 100  mg 1 tablet daily and Ativan 0.5 mg 1 tablet up to 2 times a day as needed  for episodes of severe anxiety or panic attacks prescribed by psych.   Continue to follow with psych/Behavioral Health.    Overview:  Overview:  Generalized anxiety      Shortness of breath 3/4/2015     Tobacco dependence syndrome 2017     Type 2 diabetes mellitus (H) 2017    Overview:  Diabetes Mellitus Type 2 Overview:  Overview:  Diabetes Mellitus Type 2        PAST SURGICAL HISTORY:  Past Surgical History:   Procedure Laterality Date     OTHER SURGICAL HISTORY      arm surgery           CURRENT MEDICATIONS:    No current facility-administered medications for this encounter.     Current Outpatient Medications   Medication     alcohol swab prep pads     ALPRAZolam (XANAX) 0.25 MG tablet     ammonium lactate (LAC-HYDRIN) 12 % external lotion     blood glucose (NO BRAND SPECIFIED) test strip     blood glucose calibration (NO BRAND SPECIFIED) solution     blood glucose monitoring (NO BRAND SPECIFIED) meter device kit     busPIRone (BUSPAR) 10 MG tablet     hydrOXYzine (ATARAX) 25 MG tablet     insulin glargine (LANTUS SOLOSTAR) 100 UNIT/ML pen     insulin lispro (HUMALOG KWIKPEN) 100 UNIT/ML (1 unit dial) KWIKPEN     insulin pen needle (31G X 6 MM) 31G X 6 MM miscellaneous     lisinopril (ZESTRIL) 5 MG tablet     metFORMIN (GLUCOPHAGE) 1000 MG tablet     thin (NO BRAND SPECIFIED) lancets         ALLERGIES:  Allergies   Allergen Reactions     Erythromycin-Sulfisoxazole Unknown       FAMILY HISTORY:  Family History   Problem Relation Age of Onset     Cerebrovascular Disease Father         just  of massive stroke last week       SOCIAL HISTORY:   Social History     Socioeconomic History     Marital status: Single   Tobacco Use     Smoking status: Former Smoker     Quit date: 2019     Years since quitting: 3.6     Smokeless tobacco: Never Used   Vaping Use     Vaping  "Use: Every day     Substances: Nicotine     Devices: Disposable   Substance and Sexual Activity     Alcohol use: No     Drug use: No     Sexual activity: Yes     Partners: Female       VITALS:  BP (!) 151/92   Pulse 81   Temp 98  F (36.7  C) (Oral)   Resp 16   Ht 1.778 m (5' 10\")   Wt 99.8 kg (220 lb)   SpO2 97%   BMI 31.57 kg/m      PHYSICAL EXAM    Physical Exam  Constitutional:       General: He is not in acute distress.     Appearance: He is not diaphoretic.   HENT:      Head: Atraumatic.   Eyes:      General: No scleral icterus.     Pupils: Pupils are equal, round, and reactive to light.   Cardiovascular:      Heart sounds: Normal heart sounds.   Pulmonary:      Effort: No respiratory distress.      Breath sounds: Normal breath sounds.   Abdominal:      General: Bowel sounds are normal.      Palpations: Abdomen is soft.      Tenderness: There is no abdominal tenderness.   Musculoskeletal:         General: No tenderness.   Skin:     General: Skin is warm.      Findings: No rash.           LAB:  All pertinent labs reviewed and interpreted.  Labs Ordered and Resulted from Time of ED Arrival to Time of ED Departure   GLUCOSE BY METER - Abnormal       Result Value    GLUCOSE BY METER POCT 283 (*)    BASIC METABOLIC PANEL - Abnormal    Sodium 139      Potassium 4.0      Chloride 105      Carbon Dioxide (CO2) 18 (*)     Anion Gap 16      Urea Nitrogen 13      Creatinine 0.88      Calcium 9.6      Glucose 214 (*)     GFR Estimate >90     INFLUENZA A/B & SARS-COV2 PCR MULTIPLEX - Normal    Influenza A PCR Negative      Influenza B PCR Negative      RSV PCR Negative      SARS CoV2 PCR Negative     CBC WITH PLATELETS AND DIFFERENTIAL    WBC Count 6.5      RBC Count 4.90      Hemoglobin 14.7      Hematocrit 41.0      MCV 84      MCH 30.0      MCHC 35.9      RDW 11.9      Platelet Count 244      % Neutrophils 36      % Lymphocytes 53      % Monocytes 9      % Eosinophils 1      % Basophils 1      % Immature " Granulocytes 0      NRBCs per 100 WBC 0      Absolute Neutrophils 2.3      Absolute Lymphocytes 3.5      Absolute Monocytes 0.6      Absolute Eosinophils 0.1      Absolute Basophils 0.0      Absolute Immature Granulocytes 0.0      Absolute NRBCs 0.0         RADIOLOGY:  Reviewed all pertinent imaging. Please see official radiology report.  XR Chest 2 Views   Final Result   IMPRESSION: Negative chest.                I, Ta Varma, am serving as a scribe to document services personally performed by Dr. Rakan Brantley, based on my observation and the provider's statements to me. I, Rakan Brantley MD attest that Ta Varma is acting in a scribe capacity, has observed my performance of the services and has documented them in accordance with my direction.    Rakan Brantley M.D.  Emergency Medicine  [facility] EMERGENCY DEPARTMENT  [address] [place] [id number]  [phone]  Dept: [phone]     Rakan Brantley MD  08/29/22 0415

## 2022-09-19 ENCOUNTER — HOSPITAL ENCOUNTER (EMERGENCY)
Facility: HOSPITAL | Age: 32
Discharge: HOME | End: 2022-09-19
Admitting: EMERGENCY MEDICINE
Payer: COMMERCIAL

## 2022-09-19 VITALS
HEIGHT: 70 IN | OXYGEN SATURATION: 99 % | TEMPERATURE: 98.3 F | RESPIRATION RATE: 16 BRPM | SYSTOLIC BLOOD PRESSURE: 137 MMHG | WEIGHT: 220 LBS | HEART RATE: 94 BPM | BODY MASS INDEX: 31.5 KG/M2 | DIASTOLIC BLOOD PRESSURE: 84 MMHG

## 2022-09-19 PROCEDURE — 99281 EMR DPT VST MAYX REQ PHY/QHP: CPT

## 2022-09-19 NOTE — ED NOTES
"ED Triage Provider Note  [facility]  Encounter Date: Sep 19, 2022      The patient was seen in triage to expedite ED workup.     History:  Chief Complaint   Patient presents with     Shortness of Breath     Facial Swelling     Bryan Grant is a 32 year old male with T1DM presenting for evaluation of shortness of breath and facial swelling. Has been seen for R facial swelling the past couple days (on Bactrim & amoxicillin for this) and facial swelling improving. No pain/fall trauma. No systemic symptoms. Was driving today and felt anxiety thinking about his swelling with shortness of breath; thus came to the ED. States shortness of breath resolved now. Just wants to get checked out.    Review of Systems:  - Positive for right facial swelling  - Negative for shortness of breath (resolved), chest pain, dental pain, rash, fevers, sweats, chills    Vitals:  /84   Pulse 94   Temp 98.3  F (36.8  C) (Oral)   Resp 16   Ht 1.778 m (5' 10\")   Wt 99.8 kg (220 lb)   SpO2 99%   BMI 31.57 kg/m      Brief Exam:  Mild hyperemic nonpitting edema to right maxillary area with no tenderness, poor dentition but no dental tenderness, painless EOMI with no proptosis, normal work of breathing    Medical Decision Making:  Patient arriving to the ED with problem as above. A medical screening exam was performed. Orders initiated from triage to expedite ED workup.    Facial swelling appears more hyperemic than infectious; doubt orbital cellulitis. Has photo from 2 days ago with notably worse swelling. DKA needs ruling out; will obtain blood tests. Work of breathing on exam argues against this.    The patient is appropriate to wait in triage until ED room available.      Orders Placed This Encounter   Procedures     CRP inflammation     Comprehensive metabolic panel     Ketone Beta-Hydroxybutyrate Quantitative     Blood gas venous     Glucose monitor nursing POCT     Peripheral IV catheter     CBC " with platelets differential           Raul Champagne MD  09/19/22  Emergency Medicine  [facility] EMERGENCY DEPARTMENT  [facility] [facility] [id number] [phone]  Dept: [phone]      Raul Champagne MD  09/19/22 8319

## 2022-09-19 NOTE — ED TRIAGE NOTES
PT was seen yesterday in [place] for periorbital swelling. Pt was placed on antibiotics. Today, swelling is better but while driving he felt SOB. Pt currently feels fine. Pt is a diabetic and not checking his sugars. Pt is covid positive as of last week..      Triage Assessment     Row Name 09/19/22 1504       Triage Assessment (Adult)    Airway WDL WDL       Respiratory WDL    Respiratory WDL WDL       Skin Circulation/Temperature WDL    Skin Circulation/Temperature WDL WDL       Cardiac WDL    Cardiac WDL WDL       Peripheral/Neurovascular WDL    Peripheral Neurovascular WDL WDL       Cognitive/Neuro/Behavioral WDL    Cognitive/Neuro/Behavioral WDL WDL       Iron Coma Scale    Best Eye Response 4-->(E4) spontaneous    Best Motor Response 6-->(M6) obeys commands    Best Verbal Response 5-->(V5) oriented    Iron Coma Scale Score 15

## 2022-09-21 ENCOUNTER — TRANSFERRED RECORDS (OUTPATIENT)
Dept: HEALTH INFORMATION MANAGEMENT | Facility: CLINIC | Age: 32
End: 2022-09-21

## 2022-09-21 LAB — RETINOPATHY: NORMAL

## 2022-09-25 ENCOUNTER — OFFICE VISIT (OUTPATIENT)
Dept: FAMILY MEDICINE | Facility: CLINIC | Age: 32
End: 2022-09-25
Payer: COMMERCIAL

## 2022-09-25 VITALS
TEMPERATURE: 98.4 F | HEART RATE: 92 BPM | BODY MASS INDEX: 31.57 KG/M2 | SYSTOLIC BLOOD PRESSURE: 157 MMHG | OXYGEN SATURATION: 98 % | DIASTOLIC BLOOD PRESSURE: 87 MMHG | WEIGHT: 220 LBS

## 2022-09-25 DIAGNOSIS — I10 ESSENTIAL HYPERTENSION: Primary | ICD-10-CM

## 2022-09-25 DIAGNOSIS — K29.00 ACUTE GASTRITIS WITHOUT HEMORRHAGE, UNSPECIFIED GASTRITIS TYPE: ICD-10-CM

## 2022-09-25 DIAGNOSIS — E11.9 TYPE 2 DIABETES MELLITUS WITHOUT COMPLICATION, WITHOUT LONG-TERM CURRENT USE OF INSULIN (H): ICD-10-CM

## 2022-09-25 LAB
ANION GAP SERPL CALCULATED.3IONS-SCNC: 13 MMOL/L (ref 7–15)
BASOPHILS # BLD AUTO: 0 10E3/UL (ref 0–0.2)
BASOPHILS NFR BLD AUTO: 0 %
BUN SERPL-MCNC: 16.5 MG/DL (ref 6–20)
CALCIUM SERPL-MCNC: 9.8 MG/DL (ref 8.6–10)
CHLORIDE SERPL-SCNC: 102 MMOL/L (ref 98–107)
CREAT SERPL-MCNC: 0.83 MG/DL (ref 0.67–1.17)
DEPRECATED HCO3 PLAS-SCNC: 23 MMOL/L (ref 22–29)
EOSINOPHIL # BLD AUTO: 0.1 10E3/UL (ref 0–0.7)
EOSINOPHIL NFR BLD AUTO: 1 %
ERYTHROCYTE [DISTWIDTH] IN BLOOD BY AUTOMATED COUNT: 11.8 % (ref 10–15)
GFR SERPL CREATININE-BSD FRML MDRD: >90 ML/MIN/1.73M2
GLUCOSE SERPL-MCNC: 193 MG/DL (ref 70–99)
HBA1C MFR BLD: 9.3 % (ref 0–5.6)
HCT VFR BLD AUTO: 42.2 % (ref 40–53)
HGB BLD-MCNC: 15.1 G/DL (ref 13.3–17.7)
IMM GRANULOCYTES # BLD: 0 10E3/UL
IMM GRANULOCYTES NFR BLD: 0 %
LYMPHOCYTES # BLD AUTO: 3.1 10E3/UL (ref 0.8–5.3)
LYMPHOCYTES NFR BLD AUTO: 46 %
MCH RBC QN AUTO: 29.4 PG (ref 26.5–33)
MCHC RBC AUTO-ENTMCNC: 35.8 G/DL (ref 31.5–36.5)
MCV RBC AUTO: 82 FL (ref 78–100)
MONOCYTES # BLD AUTO: 0.5 10E3/UL (ref 0–1.3)
MONOCYTES NFR BLD AUTO: 7 %
NEUTROPHILS # BLD AUTO: 3 10E3/UL (ref 1.6–8.3)
NEUTROPHILS NFR BLD AUTO: 45 %
PLATELET # BLD AUTO: 276 10E3/UL (ref 150–450)
POTASSIUM SERPL-SCNC: 4.3 MMOL/L (ref 3.4–5.3)
RBC # BLD AUTO: 5.13 10E6/UL (ref 4.4–5.9)
SODIUM SERPL-SCNC: 138 MMOL/L (ref 136–145)
WBC # BLD AUTO: 6.6 10E3/UL (ref 4–11)

## 2022-09-25 PROCEDURE — 99214 OFFICE O/P EST MOD 30 MIN: CPT | Performed by: PHYSICIAN ASSISTANT

## 2022-09-25 PROCEDURE — 80048 BASIC METABOLIC PNL TOTAL CA: CPT | Performed by: PHYSICIAN ASSISTANT

## 2022-09-25 PROCEDURE — 85025 COMPLETE CBC W/AUTO DIFF WBC: CPT | Performed by: PHYSICIAN ASSISTANT

## 2022-09-25 PROCEDURE — 83036 HEMOGLOBIN GLYCOSYLATED A1C: CPT | Performed by: PHYSICIAN ASSISTANT

## 2022-09-25 PROCEDURE — 36415 COLL VENOUS BLD VENIPUNCTURE: CPT | Performed by: PHYSICIAN ASSISTANT

## 2022-09-25 RX ORDER — ONDANSETRON 8 MG/1
8 TABLET, ORALLY DISINTEGRATING ORAL EVERY 8 HOURS PRN
Qty: 30 TABLET | Refills: 0 | Status: SHIPPED | OUTPATIENT
Start: 2022-09-25

## 2022-09-25 RX ORDER — LISINOPRIL 5 MG/1
5 TABLET ORAL DAILY
Qty: 90 TABLET | Refills: 1 | Status: SHIPPED | OUTPATIENT
Start: 2022-09-25

## 2022-09-25 NOTE — PROGRESS NOTES
Essential hypertension  - lisinopril (ZESTRIL) 5 MG tablet; Take 1 tablet (5 mg) by mouth daily    Acute gastritis without hemorrhage, unspecified gastritis type  - CBC with platelets and differential; Future  - Basic metabolic panel  (Ca, Cl, CO2, Creat, Gluc, K, Na, BUN); Future  - ondansetron (ZOFRAN ODT) 8 MG ODT tab; Take 1 tablet (8 mg) by mouth every 8 hours as needed for nausea  - CBC with platelets and differential  - Basic metabolic panel  (Ca, Cl, CO2, Creat, Gluc, K, Na, BUN)     Continue Prilosec and start pepto bismol      Type 2 diabetes mellitus without complication, without long-term current use of insulin (H)  - Hemoglobin A1c; Future  - Hemoglobin A1c     Patient was advised to monitor blood sugar levels 3 times daily and follow up with primary care in 1-3 months for any adjustments of diabetes medication. Low carb diet and 20 min of exercise daily also recommended.     Ronny Mcnamara PA-C  [facility]    Subjective   32 year old who presents to clinic today for the following health issues:    Urgent Care and Nausea       HPI     Acute Illness  Acute illness concerns: Nausea   Onset/Duration: 1 day  Symptoms:  Fever: No  Chills/Sweats: No  Headache (location?): No  Sinus Pressure: No  Conjunctivitis:  No  Ear Pain: no  Rhinorrhea: No  Congestion: No  Sore Throat: No  Cough: no  Wheeze: No  Decreased Appetite: No  Nausea: YES  Vomiting: No  Diarrhea: No  Dysuria/Freq.: No  Dysuria or Hematuria: No  Fatigue/Achiness: Fatigue but no body aches   Sick/Strep Exposure: No  Therapies tried and outcome: None    No abdominal pain or diarrhea.     Patient has HTN and has not been taking his lisinopril as prescribed. Patient has been to the ER 10+ times over the last year for various issues. Has anxiety.     Review of Systems   Review of Systems   See HPI     Objective    Temp: 98.4  F (36.9  C) Temp src: Oral BP: (!) 157/87 Pulse: 92     SpO2: 98 %       Physical Exam   Physical  Exam  Constitutional:       General: He is not in acute distress.     Appearance: Normal appearance. He is normal weight. He is not ill-appearing, toxic-appearing or diaphoretic.   HENT:      Head: Normocephalic and atraumatic.   Cardiovascular:      Rate and Rhythm: Normal rate and regular rhythm.      Pulses: Normal pulses.      Heart sounds: Normal heart sounds. No murmur heard.    No friction rub. No gallop.   Pulmonary:      Effort: Pulmonary effort is normal. No respiratory distress.      Breath sounds: Normal breath sounds. No stridor. No wheezing, rhonchi or rales.   Chest:      Chest wall: No tenderness.   Abdominal:      General: Abdomen is flat. Bowel sounds are normal. There is no distension.      Palpations: There is no mass.      Tenderness: There is no abdominal tenderness. There is no right CVA tenderness, left CVA tenderness, guarding or rebound.      Hernia: No hernia is present.   Neurological:      General: No focal deficit present.      Mental Status: He is alert and oriented to person, place, and time. Mental status is at baseline.      Gait: Gait normal.   Psychiatric:         Mood and Affect: Mood normal.         Behavior: Behavior normal.         Thought Content: Thought content normal.         Judgment: Judgment normal.          Results for orders placed or performed in visit on 09/25/22 (from the past 24 hour(s))   CBC with platelets and differential    Narrative    The following orders were created for panel order CBC with platelets and differential.  Procedure                               Abnormality         Status                     ---------                               -----------         ------                     CBC with platelets and d...[[id number]]                      Final result                 Please view results for these tests on the individual orders.   Hemoglobin A1c   Result Value Ref Range    Hemoglobin A1C 9.3 (H) 0.0 - 5.6 %   CBC with platelets and differential    Result Value Ref Range    WBC Count 6.6 4.0 - 11.0 10e3/uL    RBC Count 5.13 4.40 - 5.90 10e6/uL    Hemoglobin 15.1 13.3 - 17.7 g/dL    Hematocrit 42.2 40.0 - 53.0 %    MCV 82 78 - 100 fL    MCH 29.4 26.5 - 33.0 pg    MCHC 35.8 31.5 - 36.5 g/dL    RDW 11.8 10.0 - 15.0 %    Platelet Count 276 150 - 450 10e3/uL    % Neutrophils 45 %    % Lymphocytes 46 %    % Monocytes 7 %    % Eosinophils 1 %    % Basophils 0 %    % Immature Granulocytes 0 %    Absolute Neutrophils 3.0 1.6 - 8.3 10e3/uL    Absolute Lymphocytes 3.1 0.8 - 5.3 10e3/uL    Absolute Monocytes 0.5 0.0 - 1.3 10e3/uL    Absolute Eosinophils 0.1 0.0 - 0.7 10e3/uL    Absolute Basophils 0.0 0.0 - 0.2 10e3/uL    Absolute Immature Granulocytes 0.0 <=0.4 10e3/uL

## 2022-10-14 ENCOUNTER — MYC REFILL (OUTPATIENT)
Dept: FAMILY MEDICINE | Facility: CLINIC | Age: 32
End: 2022-10-14

## 2022-10-14 DIAGNOSIS — E11.9 TYPE 2 DIABETES MELLITUS WITHOUT COMPLICATION, WITHOUT LONG-TERM CURRENT USE OF INSULIN (H): ICD-10-CM

## 2022-10-14 DIAGNOSIS — Z79.4 TYPE 2 DIABETES MELLITUS WITHOUT COMPLICATION, WITH LONG-TERM CURRENT USE OF INSULIN (H): ICD-10-CM

## 2022-10-14 DIAGNOSIS — E11.9 TYPE 2 DIABETES MELLITUS WITHOUT COMPLICATION, WITH LONG-TERM CURRENT USE OF INSULIN (H): ICD-10-CM

## 2022-10-14 DIAGNOSIS — F41.1 GENERALIZED ANXIETY DISORDER: ICD-10-CM

## 2022-10-14 DIAGNOSIS — F41.0 PANIC ATTACK: ICD-10-CM

## 2022-10-15 RX ORDER — ALPRAZOLAM 0.25 MG
0.25 TABLET ORAL 2 TIMES DAILY PRN
Qty: 30 TABLET | Refills: 0 | Status: CANCELLED | OUTPATIENT
Start: 2022-10-15

## 2022-10-15 RX ORDER — INSULIN LISPRO 100 [IU]/ML
3 INJECTION, SOLUTION INTRAVENOUS; SUBCUTANEOUS
Qty: 10 ML | Refills: 0 | Status: SHIPPED | OUTPATIENT
Start: 2022-10-15

## 2022-10-15 NOTE — TELEPHONE ENCOUNTER
"Routing refill request to provider for review/approval because:  [place] [facility]   ?PCP    Last Written Prescription Date:  8/2/22  Last Fill Quantity: 30,  # refills: 0   Last office visit provider:  8/22/22     Requested Prescriptions   Pending Prescriptions Disp Refills     ALPRAZolam (XANAX) 0.25 MG tablet 30 tablet 0     Sig: Take 1 tablet (0.25 mg) by mouth 2 times daily as needed for anxiety       There is no refill protocol information for this order        insulin lispro (HUMALOG KWIKPEN) 100 UNIT/ML (1 unit dial) KWIKPEN 15 mL 0     Sig: Inject 3 Units Subcutaneous 3 times daily (before meals)       Short Acting Insulin Protocol Passed - 10/14/2022  1:10 PM        Passed - Serum creatinine on file in past 12 months     Recent Labs   Lab Test 09/25/22  1646   CR 0.83       Ok to refill medication if creatinine is low          Passed - HgbA1C in past 3 or 6 months     If HgbA1C is 8 or greater, it needs to be on file within the past 3 months.  If less than 8, must be on file within the past 6 months.     Recent Labs   Lab Test 09/25/22  1646   A1C 9.3*             Passed - Medication is active on med list        Passed - Patient is age 18 or older        Passed - Recent (6 mo) or future (30 days) visit within the authorizing provider's specialty     Patient had office visit in the last 6 months or has a visit in the next 30 days with authorizing provider or within the authorizing provider's specialty.  See \"Patient Info\" tab in inbasket, or \"Choose Columns\" in Meds & Orders section of the refill encounter.               hydrOXYzine (ATARAX) 25 MG tablet 60 tablet 0     Sig: Take 1-2 tablets (25-50 mg) by mouth 3 times daily as needed for anxiety       Antihistamines Protocol Passed - 10/14/2022  1:10 PM        Passed - Recent (12 mo) or future (30 days) visit within the authorizing provider's specialty     Patient has had an office visit with the authorizing provider or a provider within " "the authorizing providers department within the previous 12 mos or has a future within next 30 days. See \"Patient Info\" tab in inbasket, or \"Choose Columns\" in Meds & Orders section of the refill encounter.              Passed - Patient is age 3 or older     Apply age and/or weight-based dosing for peds patients age 3 and older.    Forward request to provider for patients under the age of 3.          Passed - Medication is active on med list             Malou Giordano RN 10/15/22 12:41 PM  "

## 2022-10-15 NOTE — TELEPHONE ENCOUNTER
"Last Written Prescription Date:  8/18/22  Last Fill Quantity: 100,  # refills: 1   Last office visit provider:  8/22/22     Requested Prescriptions   Pending Prescriptions Disp Refills     insulin pen needle (31G X 6 MM) 31G X 6 MM miscellaneous 100 each 1     Sig: Use 4 pen needles daily or as directed.       Diabetic Supplies Protocol Passed - 10/14/2022  1:29 PM        Passed - Medication is active on med list        Passed - Patient is 18 years of age or older        Passed - Recent (6 mo) or future (30 days) visit within the authorizing provider's specialty     Patient had office visit in the last 6 months or has a visit in the next 30 days with authorizing provider.  See \"Patient Info\" tab in inbasket, or \"Choose Columns\" in Meds & Orders section of the refill encounter.                 Malou Giordano RN 10/15/22 12:47 PM  "

## 2022-10-16 ENCOUNTER — MYC MEDICAL ADVICE (OUTPATIENT)
Dept: FAMILY MEDICINE | Facility: CLINIC | Age: 32
End: 2022-10-16

## 2022-10-16 DIAGNOSIS — F41.0 PANIC ATTACK: ICD-10-CM

## 2022-10-16 RX ORDER — HYDROXYZINE HYDROCHLORIDE 25 MG/1
25-50 TABLET, FILM COATED ORAL 3 TIMES DAILY PRN
Qty: 60 TABLET | Refills: 0 | Status: SHIPPED | OUTPATIENT
Start: 2022-10-16 | End: 2022-11-20

## 2022-10-17 RX ORDER — ALPRAZOLAM 0.25 MG
0.25 TABLET ORAL 2 TIMES DAILY PRN
Qty: 30 TABLET | Refills: 0 | OUTPATIENT
Start: 2022-10-17

## 2022-10-17 NOTE — TELEPHONE ENCOUNTER
It looks like this request was entered in error.    Note BP is high. Please schedule patient for a nurse visit for BP check and shots.     No future appointments.   Health Maintenance Due   Topic Date Due     NICOTINE/TOBACCO CESSATION COUNSELING Q 1 YR  Never done     YEARLY PREVENTIVE VISIT  Never done     DIABETIC FOOT EXAM  Never done     ADVANCE CARE PLANNING  Never done     DEPRESSION ACTION PLAN  Never done     COVID-19 Vaccine (1) Never done     HEPATITIS B IMMUNIZATION (3 of 3 - 3-dose series) 02/04/2005     Pneumococcal Vaccine: Pediatrics (0 to 5 Years) and At-Risk Patients (6 to 64 Years) (2 - PCV) 02/06/2018     INFLUENZA VACCINE (1) Never done     BP Readings from Last 3 Encounters:   09/25/22 (!) 157/87   08/29/22 (!) 151/92   08/14/22 118/89     Diagnoses and all orders for this visit:    Panic attack

## 2022-10-22 ENCOUNTER — HOSPITAL ENCOUNTER (EMERGENCY)
Facility: HOSPITAL | Age: 32
Discharge: HOME | End: 2022-10-23
Attending: EMERGENCY MEDICINE | Admitting: EMERGENCY MEDICINE
Payer: COMMERCIAL

## 2022-10-22 ENCOUNTER — APPOINTMENT (OUTPATIENT)
Dept: RADIOLOGY | Facility: HOSPITAL | Age: 32
End: 2022-10-22
Attending: EMERGENCY MEDICINE
Payer: COMMERCIAL

## 2022-10-22 DIAGNOSIS — R20.2 PARESTHESIAS: ICD-10-CM

## 2022-10-22 LAB
ALBUMIN UR-MCNC: NEGATIVE MG/DL
ANION GAP SERPL CALCULATED.3IONS-SCNC: 12 MMOL/L (ref 7–15)
APPEARANCE UR: CLEAR
BASOPHILS # BLD AUTO: 0 10E3/UL (ref 0–0.2)
BASOPHILS NFR BLD AUTO: 0 %
BILIRUB UR QL STRIP: NEGATIVE
BUN SERPL-MCNC: 11 MG/DL (ref 6–20)
CALCIUM SERPL-MCNC: 9.5 MG/DL (ref 8.6–10)
CHLORIDE SERPL-SCNC: 98 MMOL/L (ref 98–107)
COLOR UR AUTO: ABNORMAL
CREAT SERPL-MCNC: 0.69 MG/DL (ref 0.67–1.17)
D DIMER PPP FEU-MCNC: <0.27 UG/ML FEU (ref 0–0.5)
DEPRECATED HCO3 PLAS-SCNC: 25 MMOL/L (ref 22–29)
EOSINOPHIL # BLD AUTO: 0.1 10E3/UL (ref 0–0.7)
EOSINOPHIL NFR BLD AUTO: 1 %
ERYTHROCYTE [DISTWIDTH] IN BLOOD BY AUTOMATED COUNT: 11.9 % (ref 10–15)
GFR SERPL CREATININE-BSD FRML MDRD: >90 ML/MIN/1.73M2
GLUCOSE BLDC GLUCOMTR-MCNC: 244 MG/DL (ref 70–99)
GLUCOSE SERPL-MCNC: 235 MG/DL (ref 70–99)
GLUCOSE UR STRIP-MCNC: 70 MG/DL
HCT VFR BLD AUTO: 42.6 % (ref 40–53)
HGB BLD-MCNC: 15.1 G/DL (ref 13.3–17.7)
HGB UR QL STRIP: NEGATIVE
HOLD SPECIMEN: NORMAL
IMM GRANULOCYTES # BLD: 0 10E3/UL
IMM GRANULOCYTES NFR BLD: 0 %
KETONES UR STRIP-MCNC: NEGATIVE MG/DL
LEUKOCYTE ESTERASE UR QL STRIP: NEGATIVE
LYMPHOCYTES # BLD AUTO: 3.9 10E3/UL (ref 0.8–5.3)
LYMPHOCYTES NFR BLD AUTO: 57 %
MCH RBC QN AUTO: 30 PG (ref 26.5–33)
MCHC RBC AUTO-ENTMCNC: 35.4 G/DL (ref 31.5–36.5)
MCV RBC AUTO: 85 FL (ref 78–100)
MONOCYTES # BLD AUTO: 0.6 10E3/UL (ref 0–1.3)
MONOCYTES NFR BLD AUTO: 8 %
MUCOUS THREADS #/AREA URNS LPF: PRESENT /LPF
NEUTROPHILS # BLD AUTO: 2.4 10E3/UL (ref 1.6–8.3)
NEUTROPHILS NFR BLD AUTO: 34 %
NITRATE UR QL: NEGATIVE
NRBC # BLD AUTO: 0 10E3/UL
NRBC BLD AUTO-RTO: 0 /100
PH UR STRIP: 5 [PH] (ref 5–7)
PLATELET # BLD AUTO: 260 10E3/UL (ref 150–450)
POTASSIUM SERPL-SCNC: 3.9 MMOL/L (ref 3.4–5.3)
RBC # BLD AUTO: 5.04 10E6/UL (ref 4.4–5.9)
RBC URINE: 1 /HPF
SODIUM SERPL-SCNC: 135 MMOL/L (ref 136–145)
SP GR UR STRIP: 1.02 (ref 1–1.03)
TROPONIN T SERPL HS-MCNC: <6 NG/L
UROBILINOGEN UR STRIP-MCNC: <2 MG/DL
WBC # BLD AUTO: 6.9 10E3/UL (ref 4–11)
WBC URINE: <1 /HPF

## 2022-10-22 PROCEDURE — 93005 ELECTROCARDIOGRAM TRACING: CPT | Performed by: EMERGENCY MEDICINE

## 2022-10-22 PROCEDURE — 85018 HEMOGLOBIN: CPT | Performed by: EMERGENCY MEDICINE

## 2022-10-22 PROCEDURE — 81001 URINALYSIS AUTO W/SCOPE: CPT | Performed by: EMERGENCY MEDICINE

## 2022-10-22 PROCEDURE — 81001 URINALYSIS AUTO W/SCOPE: CPT | Performed by: STUDENT IN AN ORGANIZED HEALTH CARE EDUCATION/TRAINING PROGRAM

## 2022-10-22 PROCEDURE — 71045 X-RAY EXAM CHEST 1 VIEW: CPT

## 2022-10-22 PROCEDURE — 84484 ASSAY OF TROPONIN QUANT: CPT | Performed by: EMERGENCY MEDICINE

## 2022-10-22 PROCEDURE — 85379 FIBRIN DEGRADATION QUANT: CPT | Performed by: EMERGENCY MEDICINE

## 2022-10-22 PROCEDURE — 93005 ELECTROCARDIOGRAM TRACING: CPT | Performed by: STUDENT IN AN ORGANIZED HEALTH CARE EDUCATION/TRAINING PROGRAM

## 2022-10-22 PROCEDURE — 36415 COLL VENOUS BLD VENIPUNCTURE: CPT | Performed by: EMERGENCY MEDICINE

## 2022-10-22 PROCEDURE — 99285 EMERGENCY DEPT VISIT HI MDM: CPT | Mod: 25

## 2022-10-22 PROCEDURE — 82435 ASSAY OF BLOOD CHLORIDE: CPT | Performed by: EMERGENCY MEDICINE

## 2022-10-22 ASSESSMENT — ACTIVITIES OF DAILY LIVING (ADL): ADLS_ACUITY_SCORE: 33

## 2022-10-22 NOTE — Clinical Note
Bryan Arayamessity was seen and treated in our emergency department on 10/22/2022.  He may return to work on 10/24/2022.       If you have any questions or concerns, please don't hesitate to call.      Vasile Avila, DO

## 2022-10-23 VITALS
WEIGHT: 225 LBS | RESPIRATION RATE: 16 BRPM | DIASTOLIC BLOOD PRESSURE: 85 MMHG | HEART RATE: 72 BPM | OXYGEN SATURATION: 97 % | BODY MASS INDEX: 32.28 KG/M2 | SYSTOLIC BLOOD PRESSURE: 130 MMHG | TEMPERATURE: 98.4 F

## 2022-10-23 NOTE — ED PROVIDER NOTES
EMERGENCY DEPARTMENT NOTE     Name: Bryan Grant    Age/Sex: 32 year old male   MRN: [id number]   Evaluation Date & Time:  10/22/2022 10:27 PM    PCP:    No Ref-Primary, Physician   ED Provider: Vasile Avila D.O.       CHIEF COMPLAINT    Tingling       DIAGNOSIS & DISPOSITION     1. Paresthesias      DISPOSITION: Home    At the conclusion of the encounter I discussed the results of all of the tests and the disposition. The questions were answered. The patient or family acknowledged understanding and was agreeable with the care plan.    TOTAL CRITICAL CARE TIME (EXCLUDING PROCEDURES): Not applicable    PROCEDURES:   None    EMERGENCY DEPARTMENT COURSE/MEDICAL DECISION MAKING   10:34 PM I met with the patient to gather history and to perform my initial exam.  We discussed treatment options and the plan for care while in the Emergency Department.    Bryan Grant is a 32 year old male with relevant past history of generalized anxiety disorder, essential hypertension, type 2 diabetes mellitus, tobacco dependence syndrome, who presents to the emergency department for evaluation of paresthesias. Patient endorses entire body numbness that started today.  Currently resolved    Triage note reviewed:  Pt states feeling tingling all over body for about an hour.  Denies hyperventilating.  Pt wife was seen here in ER prior to symptoms starting.  Pt states this started when they got to family's home.      Triage Assessment     Row Name 10/22/22 3153       Triage Assessment (Adult)    Airway WDL WDL       Respiratory WDL    Respiratory WDL WDL       Skin Circulation/Temperature WDL    Skin Circulation/Temperature WDL WDL       Cardiac WDL    Cardiac WDL WDL       Peripheral/Neurovascular WDL    Peripheral Neurovascular WDL WDL       Cognitive/Neuro/Behavioral WDL    Cognitive/Neuro/Behavioral WDL WDL                Differential  diagnosis considered included but not limited to anxiety, ACS, thoracic a aortic  dissection, electrolyte derangement, VS/transverse myelitis  Vital signs:/68   Pulse 80   Temp 98.4  F (36.9  C) (Temporal)   Resp 16   Wt 102.1 kg (225 lb)   SpO2 97%   BMI 32.28 kg/m    Pertinent physical exam findings:  General: Alert normal mental status  HEENT: Neck supple no meningeal rotation  Cardiac: Regular rate and rhythm S1-S2 without murmur rub  Pulmonary: Lungs are clear to ascultation bilaterally with good breath sounds  Abdomen: Soft nontender, positive bowel sounds.  No organomegaly or mass  Neuro: Cranial nerves 2 through 12 are intact.  5 out of 5 motor strength upper and lower extremities.  Intact sensation to light touch and positional sense.  No pronator drift.  Normal cerebellar function with serial fingers and heel-to-shin.  Deep tendon reflexes are diminished at the patella and the ankle but equal and patient states this is normal for him  Diagnostic studies:  Imaging:  XR Chest Port 1 View   Final Result   IMPRESSION: There is prominence of the main pulmonary artery again seen. Lungs clear. Normal heart size.         Lab:  Labs Ordered and Resulted from Time of ED Arrival to Time of ED Departure   ROUTINE UA WITH MICROSCOPIC REFLEX TO CULTURE - Abnormal       Result Value    Color Urine Light Yellow      Appearance Urine Clear      Glucose Urine 70 (*)     Bilirubin Urine Negative      Ketones Urine Negative      Specific Gravity Urine 1.024      Blood Urine Negative      pH Urine 5.0      Protein Albumin Urine Negative      Urobilinogen Urine <2.0      Nitrite Urine Negative      Leukocyte Esterase Urine Negative      Mucus Urine Present (*)     RBC Urine 1      WBC Urine <1     GLUCOSE BY METER - Abnormal    GLUCOSE BY METER POCT 244 (*)    BASIC METABOLIC PANEL - Abnormal    Sodium 135 (*)     Potassium 3.9      Chloride 98      Carbon Dioxide (CO2) 25      Anion Gap 12      Urea Nitrogen 11.0      Creatinine 0.69      Calcium 9.5      Glucose 235 (*)     GFR Estimate >90      TROPONIN T, HIGH SENSITIVITY - Normal    Troponin T, High Sensitivity <6     D DIMER QUANTITATIVE - Normal    D-Dimer Quantitative <0.27     GLUCOSE MONITOR NURSING POCT   CBC WITH PLATELETS AND DIFFERENTIAL    WBC Count 6.9      RBC Count 5.04      Hemoglobin 15.1      Hematocrit 42.6      MCV 85      MCH 30.0      MCHC 35.4      RDW 11.9      Platelet Count 260      % Neutrophils 34      % Lymphocytes 57      % Monocytes 8      % Eosinophils 1      % Basophils 0      % Immature Granulocytes 0      NRBCs per 100 WBC 0      Absolute Neutrophils 2.4      Absolute Lymphocytes 3.9      Absolute Monocytes 0.6      Absolute Eosinophils 0.1      Absolute Basophils 0.0      Absolute Immature Granulocytes 0.0      Absolute NRBCs 0.0        Interventions:None  Medical decision making: Low suspicion for acute process based on exam and current laboratory evaluation.  Patient will be discharged to continue antianxiety medications as previously prescribed and return to the emergency department if recurrent or persistent symptoms  Medical Decision Making    Supplemental history from: N/A    External Record(s) Reviewed: Inpatient Record, Outpatient Record and Outside ED Record    Differential Diagnosis: See MDM charting for differential considered.     I performed an independent interpretation of the: EKG: See my documentation.    Discussed with radiology regarding test interpretation: N/A    Discussion of management with another provider: N/A    The following testing was considered but ultimately not selected: CT Imaging     I considered prescription management with: Symptomatic Management    The patient's care impacted: None and Mental Health    Consideration of Admission/Observation: Admission/Observation considered but ultimately discharged: to home    Care significantly affected by Social Determinants of Health including: N/A  ED INTERVENTIONS   Medications - No data to display    DISCHARGE MEDICATIONS        Review of your  medicines      UNREVIEWED medicines. Ask your doctor about these medicines      Dose / Directions   ALPRAZolam 0.25 MG tablet  Commonly known as: XANAX  Used for: Panic attack      Dose: 0.25 mg  Take 1 tablet (0.25 mg) by mouth 2 times daily as needed for anxiety  Quantity: 30 tablet  Refills: 0     ammonium lactate 12 % external lotion  Commonly known as: LAC-HYDRIN  Used for: Dry skin      Apply topically 2 times daily  Quantity: 222 mL  Refills: 1     busPIRone 10 MG tablet  Commonly known as: BUSPAR  Used for: Generalized anxiety disorder      Dose: 10 mg  Take 1 tablet (10 mg) by mouth 2 times daily  Quantity: 60 tablet  Refills: 1     hydrOXYzine 25 MG tablet  Commonly known as: ATARAX  Used for: Panic attack, Generalized anxiety disorder      Dose: 25-50 mg  Take 1-2 tablets (25-50 mg) by mouth 3 times daily as needed for anxiety  Quantity: 60 tablet  Refills: 0     insulin lispro 100 UNIT/ML (1 unit dial) KWIKPEN  Commonly known as: HumaLOG KWIKpen  Used for: Type 2 diabetes mellitus without complication, without long-term current use of insulin (H)      Dose: 3 Units  Inject 3 Units Subcutaneous 3 times daily (before meals)  Quantity: 10 mL  Refills: 0     Lantus SoloStar 100 UNIT/ML pen  Used for: Type 2 diabetes mellitus without complication, without long-term current use of insulin (H)  Generic drug: insulin glargine      Dose: 10 Units  Inject 10 Units Subcutaneous At Bedtime  Quantity: 15 mL  Refills: 1     lisinopril 5 MG tablet  Commonly known as: ZESTRIL  Used for: Essential hypertension, Type 2 diabetes mellitus without complication, without long-term current use of insulin (H)      Dose: 5 mg  Take 1 tablet (5 mg) by mouth daily  Quantity: 90 tablet  Refills: 1     metFORMIN 1000 MG tablet  Commonly known as: GLUCOPHAGE      Dose: 2,000 mg  Take 2,000 mg by mouth  Refills: 0     ondansetron 8 MG ODT tab  Commonly known as: ZOFRAN ODT  Used for: Acute gastritis without hemorrhage, unspecified  gastritis type      Dose: 8 mg  Take 1 tablet (8 mg) by mouth every 8 hours as needed for nausea  Quantity: 30 tablet  Refills: 0        CONTINUE these medicines which have NOT CHANGED      Dose / Directions   alcohol swab prep pads  Used for: Type 2 diabetes mellitus without complication, with long-term current use of insulin (H)      Use to swab area of injection/samantha as directed  Quantity: 100 each  Refills: 3     blood glucose calibration solution  Commonly known as: NO BRAND SPECIFIED  Used for: Type 2 diabetes mellitus without complication, with long-term current use of insulin (H)      Use to calibrate blood glucose monitor as needed as directed. To accompany: Blood Glucose Monitor Brands: per insurance.  Quantity: 1 each  Refills: 0     blood glucose monitoring meter device kit  Commonly known as: NO BRAND SPECIFIED  Used for: Type 2 diabetes mellitus without complication, with long-term current use of insulin (H)      Use to test blood sugar daily or as directed.  Quantity: 1 kit  Refills: 0     blood glucose test strip  Commonly known as: NO BRAND SPECIFIED  Used for: Type 2 diabetes mellitus without complication, with long-term current use of insulin (H)      Use to test blood sugar daily. To accompany: Blood Glucose Monitor Brands: per insurance.  Quantity: 100 strip  Refills: 6     insulin pen needle 31G X 6 MM miscellaneous  Commonly known as: 31G X 6 MM  Used for: Type 2 diabetes mellitus without complication, without long-term current use of insulin (H), Type 2 diabetes mellitus without complication, with long-term current use of insulin (H)      Use 4 pen needles daily or as directed.  Quantity: 400 each  Refills: 0     thin lancets  Commonly known as: NO BRAND SPECIFIED  Used for: Type 2 diabetes mellitus without complication, with long-term current use of insulin (H)      Use to test blood sugar daily or as directed. To accompany: Blood Glucose Monitor Brands: per insurance.  Quantity: 1  "each  Refills: 3              INFORMATION SOURCE AND LIMITATIONS    History/Exam limitations: none  Patient information was obtained from: patient  Use of : N/A    HISTORY OF PRESENT ILLNESS   Bryan Grant is a 32 year old year old male with a relevant past history of generalized anxiety disorder, essential hypertension, type 2 diabetes mellitus, tobacco dependence syndrome, who presents to this ED via self walk-in for evaluation of tingling.    Patient reports entire body numbness that started today. He's concerned it may be something other than anxiety. He also reports generalized weakness and bilateral leg pain (he describes like \"growing pain\"). He denies cough, sore throat, fever, vomiting, diarrhea, chest pain, abdominal pain, and shortness of breath.     He is a smoker. He denies alcohol use or illicit drug use. He is currently on hydroxyzine for anxiety. He works at [employer] in [employer].             REVIEW OF SYSTEMS:   Constitutional: Negative for  fever.   HENT: Negative for URI symptoms or sore throat.    Cardiac: Negative for  chest pain,palpitations, near syncope or syncope  Respiratory: Negative for cough and shortness of breath.    Gastrointestinal: Negative for abdominal pain, nausea, vomiting, constipation, diarrhea, rectal bleeding or melena.  Genitourinary: Negative for dysuria, flank pain and hematuria.   Musculoskeletal: Endorses bilateral leg pain. Negative for back pain.   Skin: Negative for  rash  Neurological: Endorses entire body numbness and weakness. Negative for dizziness, headache, syncope, speech difficulty, or imbalance with walking.   Hematological: Negative for adenopathy. Does not bruise/bleed easily.   Psychiatric/Behavioral: Negative for confusion.       PATIENT HISTORY     Past Medical History:   Diagnosis Date     Essential hypertension 2/6/2017     Hyperlipidemia 2/27/2017     Recurrent major depressive disorder (H) 11/14/2017     Shortness of breath " 3/4/2015     Tobacco dependence syndrome 2/6/2017     Type 2 diabetes mellitus (H) 2/6/2017     Patient Active Problem List   Diagnosis     Essential hypertension     Generalized anxiety disorder     Hyperlipidemia     Recurrent major depressive disorder (H)     Shortness of breath     Type 2 diabetes mellitus (H)     Tobacco dependence syndrome     Class 1 obesity due to excess calories without serious comorbidity with body mass index (BMI) of 33.0 to 33.9 in adult     Dry skin     Past Surgical History:   Procedure Laterality Date     OTHER SURGICAL HISTORY      arm surgery     Social Histrory  Smoking:  Alcohol Use:  Allergies   Allergen Reactions     Erythromycin-Sulfisoxazole Unknown     Denies 9/19/22         OUTPATIENT MEDICATIONS     New Prescriptions    No medications on file      Vitals:    10/22/22 2144 10/22/22 2208 10/22/22 2331   BP: (!) 153/105 (!) 142/91 122/68   Pulse: 92  80   Resp: 16     Temp: 98.4  F (36.9  C)     TempSrc: Temporal     SpO2: 98%  97%   Weight: 102.1 kg (225 lb)         Physical Exam   Constitutional: Oriented to person, place, and time. Appears well-developed and well-nourished.   HEENT:    Head: Atraumatic.   Neck: Normal range of motion. Neck supple.   Cardiovascular: Normal rate, regular rhythm and normal heart sounds.    Pulmonary/Chest: Normal effort  and breath sounds normal.   Abdominal: Soft. Bowel sounds are normal.   Musculoskeletal: Normal range of motion.   Neurological: Alert and oriented to person, place, and time. Normal strength.No sensory deficit. No cranial nerve deficit . Skin: Skin is warm and dry.   Psychiatric: Normal mood and affect. Behavior is normal. Thought content normal.       DIAGNOSTICS    LABORATORY FINDINGS (REVIEWED AND INTERPRETED):  Labs Ordered and Resulted from Time of ED Arrival to Time of ED Departure   ROUTINE UA WITH MICROSCOPIC REFLEX TO CULTURE - Abnormal       Result Value    Color Urine Light Yellow      Appearance Urine Clear       Glucose Urine 70 (*)     Bilirubin Urine Negative      Ketones Urine Negative      Specific Gravity Urine 1.024      Blood Urine Negative      pH Urine 5.0      Protein Albumin Urine Negative      Urobilinogen Urine <2.0      Nitrite Urine Negative      Leukocyte Esterase Urine Negative      Mucus Urine Present (*)     RBC Urine 1      WBC Urine <1     GLUCOSE BY METER - Abnormal    GLUCOSE BY METER POCT 244 (*)    BASIC METABOLIC PANEL - Abnormal    Sodium 135 (*)     Potassium 3.9      Chloride 98      Carbon Dioxide (CO2) 25      Anion Gap 12      Urea Nitrogen 11.0      Creatinine 0.69      Calcium 9.5      Glucose 235 (*)     GFR Estimate >90     TROPONIN T, HIGH SENSITIVITY - Normal    Troponin T, High Sensitivity <6     D DIMER QUANTITATIVE - Normal    D-Dimer Quantitative <0.27     GLUCOSE MONITOR NURSING POCT   CBC WITH PLATELETS AND DIFFERENTIAL    WBC Count 6.9      RBC Count 5.04      Hemoglobin 15.1      Hematocrit 42.6      MCV 85      MCH 30.0      MCHC 35.4      RDW 11.9      Platelet Count 260      % Neutrophils 34      % Lymphocytes 57      % Monocytes 8      % Eosinophils 1      % Basophils 0      % Immature Granulocytes 0      NRBCs per 100 WBC 0      Absolute Neutrophils 2.4      Absolute Lymphocytes 3.9      Absolute Monocytes 0.6      Absolute Eosinophils 0.1      Absolute Basophils 0.0      Absolute Immature Granulocytes 0.0      Absolute NRBCs 0.0           IMAGING (REVIEWED AND INTERPRETED):  XR Chest Port 1 View   Final Result   IMPRESSION: There is prominence of the main pulmonary artery again seen. Lungs clear. Normal heart size.              ECG (REVIEWED AND INTERPRETED):   ECG:   Performed at: 22:10  HR:  78 bpm  Rhythm: Sinus  Axis: 37  QRS duration: 86 ms  QTC: 414 ns  ST changes: No ST segment elevation or depression, no T wave inversion,No Q wave  Interpretation: Normal sinus rhythm  Compared to most recent ECG from: August 2022 no change    I have reviewed the patient's ECG, with  comments made as listed above. Please see scanned image for full interpretation.         I, Enedelia Mckenzie, am serving as a scribe to document services personally performed by Vasile Avlia D.O., based on my observation and the provider s statements to me.    I, Vasile Avila D.O., attest that Enedelia Mckenzie is acting in a scribe capacity, has observed my performance of the services and has documented them in accordance with my direction.    Vasile Avila D.O.  EMERGENCY MEDICINE   10/22/22  [facility] EMERGENCY DEPARTMENT  [address] [place] [id number]  [phone]  Dept: [phone]     Vasile Avila,   10/24/22 0125

## 2022-10-23 NOTE — ED TRIAGE NOTES
Pt states feeling tingling all over body for about an hour.  Denies hyperventilating.  Pt wife was seen here in ER prior to symptoms starting.  Pt states this started when they got to family's home.      Triage Assessment     Row Name 10/22/22 2082       Triage Assessment (Adult)    Airway WDL WDL       Respiratory WDL    Respiratory WDL WDL       Skin Circulation/Temperature WDL    Skin Circulation/Temperature WDL WDL       Cardiac WDL    Cardiac WDL WDL       Peripheral/Neurovascular WDL    Peripheral Neurovascular WDL WDL       Cognitive/Neuro/Behavioral WDL    Cognitive/Neuro/Behavioral WDL WDL

## 2022-10-23 NOTE — DISCHARGE INSTRUCTIONS
Follow-up with your primary care clinic next week.  If you have recurrent numbness or tingling trial use of Vistaril or Xanax.  If recurrent numbness or tingling that persists or any new symptoms including weakness in your arms or legs, chest pain, shortness of breath abdominal pain, fever return to the emergency department.

## 2022-11-20 ENCOUNTER — MYC REFILL (OUTPATIENT)
Dept: FAMILY MEDICINE | Facility: CLINIC | Age: 32
End: 2022-11-20

## 2022-11-20 ENCOUNTER — HEALTH MAINTENANCE LETTER (OUTPATIENT)
Age: 32
End: 2022-11-20

## 2022-11-20 DIAGNOSIS — F41.1 GENERALIZED ANXIETY DISORDER: ICD-10-CM

## 2022-11-20 DIAGNOSIS — F41.0 PANIC ATTACK: ICD-10-CM

## 2022-11-21 RX ORDER — HYDROXYZINE HYDROCHLORIDE 25 MG/1
25-50 TABLET, FILM COATED ORAL 3 TIMES DAILY PRN
Qty: 270 TABLET | Refills: 2 | Status: SHIPPED | OUTPATIENT
Start: 2022-11-21

## 2022-11-21 NOTE — TELEPHONE ENCOUNTER
"Last Written Prescription Date:  10/16/22  Last Fill Quantity: 60,  # refills: 0   Last office visit provider:  8/22/22     Requested Prescriptions   Pending Prescriptions Disp Refills     hydrOXYzine (ATARAX) 25 MG tablet 60 tablet 0     Sig: Take 1-2 tablets (25-50 mg) by mouth 3 times daily as needed for anxiety       Antihistamines Protocol Passed - 11/21/2022  2:14 PM        Passed - Recent (12 mo) or future (30 days) visit within the authorizing provider's specialty     Patient has had an office visit with the authorizing provider or a provider within the authorizing providers department within the previous 12 mos or has a future within next 30 days. See \"Patient Info\" tab in inbasket, or \"Choose Columns\" in Meds & Orders section of the refill encounter.              Passed - Patient is age 3 or older     Apply age and/or weight-based dosing for peds patients age 3 and older.    Forward request to provider for patients under the age of 3.          Passed - Medication is active on med list             Juan Kahn RN 11/21/22 2:15 PM  "

## 2023-01-12 LAB
ATRIAL RATE - MUSE: 78 BPM
DIASTOLIC BLOOD PRESSURE - MUSE: NORMAL MMHG
INTERPRETATION ECG - MUSE: NORMAL
P AXIS - MUSE: 21 DEGREES
PR INTERVAL - MUSE: 162 MS
QRS DURATION - MUSE: 86 MS
QT - MUSE: 364 MS
QTC - MUSE: 414 MS
R AXIS - MUSE: 37 DEGREES
SYSTOLIC BLOOD PRESSURE - MUSE: NORMAL MMHG
T AXIS - MUSE: 60 DEGREES
VENTRICULAR RATE- MUSE: 78 BPM

## 2023-03-15 ENCOUNTER — TELEPHONE (OUTPATIENT)
Dept: PEDIATRICS | Facility: CLINIC | Age: 33
End: 2023-03-15
Payer: COMMERCIAL

## 2023-03-15 NOTE — TELEPHONE ENCOUNTER
Opened in Error    FAX Refill Request from [facility]- patient does not have PCP and only seen [facility]    Unable to request refill

## 2023-03-24 ENCOUNTER — OFFICE VISIT (OUTPATIENT)
Dept: OPHTHALMOLOGY | Facility: CLINIC | Age: 33
End: 2023-03-24
Attending: FAMILY MEDICINE
Payer: COMMERCIAL

## 2023-03-24 DIAGNOSIS — H52.223 REGULAR ASTIGMATISM OF BOTH EYES: ICD-10-CM

## 2023-03-24 DIAGNOSIS — E11.65 TYPE 2 DIABETES MELLITUS WITH HYPERGLYCEMIA, WITHOUT LONG-TERM CURRENT USE OF INSULIN (H): Primary | ICD-10-CM

## 2023-03-24 DIAGNOSIS — H53.001 AMBLYOPIA OF RIGHT EYE: ICD-10-CM

## 2023-03-24 DIAGNOSIS — Z01.01 ENCOUNTER FOR EXAMINATION OF EYES AND VISION WITH ABNORMAL FINDINGS: ICD-10-CM

## 2023-03-24 PROCEDURE — 92004 COMPRE OPH EXAM NEW PT 1/>: CPT | Performed by: OPHTHALMOLOGY

## 2023-03-24 PROCEDURE — 92015 DETERMINE REFRACTIVE STATE: CPT | Performed by: OPHTHALMOLOGY

## 2023-03-24 ASSESSMENT — CONF VISUAL FIELD
OS_NORMAL: 1
OS_INFERIOR_TEMPORAL_RESTRICTION: 0
OD_INFERIOR_NASAL_RESTRICTION: 0
OD_INFERIOR_TEMPORAL_RESTRICTION: 0
OS_SUPERIOR_NASAL_RESTRICTION: 0
OD_SUPERIOR_TEMPORAL_RESTRICTION: 0
OS_SUPERIOR_TEMPORAL_RESTRICTION: 0
OD_NORMAL: 1
OS_INFERIOR_NASAL_RESTRICTION: 0
OD_SUPERIOR_NASAL_RESTRICTION: 0

## 2023-03-24 ASSESSMENT — REFRACTION_MANIFEST
OS_SPHERE: -4.00
OS_CYLINDER: +5.50
OD_SPHERE: -3.25
OS_AXIS: 081
OD_CYLINDER: +6.00
OD_AXIS: 103

## 2023-03-24 ASSESSMENT — VISUAL ACUITY
OD_CC: 20/50
OS_CC: 20/30
CORRECTION_TYPE: GLASSES
OD_CC+: -2
METHOD: SNELLEN - LINEAR

## 2023-03-24 ASSESSMENT — EXTERNAL EXAM - RIGHT EYE: OD_EXAM: NORMAL

## 2023-03-24 ASSESSMENT — EXTERNAL EXAM - LEFT EYE: OS_EXAM: NORMAL

## 2023-03-24 ASSESSMENT — SLIT LAMP EXAM - LIDS
COMMENTS: 1+ DERMATOCHALASIS
COMMENTS: 1+ DERMATOCHALASIS

## 2023-03-24 ASSESSMENT — CUP TO DISC RATIO
OS_RATIO: 0.3
OD_RATIO: 0.3

## 2023-03-24 ASSESSMENT — REFRACTION_WEARINGRX
OS_SPHERE: -3.50
OS_CYLINDER: +6.50
OS_AXIS: 078
OD_SPHERE: -3.00
OD_AXIS: 101
SPECS_TYPE: SVL
OD_CYLINDER: +6.75

## 2023-03-24 NOTE — LETTER
"    3/24/2023         RE: Bryan Grant  [address]  [place] [address]        Dear Colleague,    Thank you for referring your patient, Bryan Grant, to the [facility]. Please see a copy of my visit note below.     Current Eye Medications:  none     Subjective:  Here for complete exam. Having more trouble seeing in the distance. Right eye is worse than the left. Was supposed to patch as a child, but didn't wear the patch.     B 2023    Hemoglobin A1C   Date Value Ref Range Status   2022 9.3 (H) 0.0 - 5.6 % Final     Comment:     Normal <5.7%   Prediabetes 5.7-6.4%    Diabetes 6.5% or higher     Note: Adopted from ADA consensus guidelines.     Declined dilation today as has long drive and no .     Objective:  See Ophthalmology Exam.       Assessment:  Baseline eye exam in patient with diabetes and amblyopia right eye.  No obvious diabetic retinopathy.      ICD-10-CM    1. Type 2 diabetes mellitus with hyperglycemia, without long-term current use of insulin (H)  E11.65       2. Amblyopia, mild-moderate, of right eye  H53.001       3. Encounter for examination of eyes and vision with abnormal findings  Z01.01       4. Regular astigmatism of both eyes  H52.223            Plan:  Glasses prescription given - optional  May use artificial tears up to four times a day (like Refresh Optive, Systane Balance, TheraTears, or generic artificial tears are ok. Avoid \"get the red out\" drops).  Understands importance of dilation for optimal exam - will plan for next visit.  Call in 2023 for an appointment in 2024 for Complete Exam    Dr. Ross [phone]           Again, thank you for allowing me to participate in the care of your patient.        Sincerely,        Alessio Ross MD    "

## 2023-03-24 NOTE — PATIENT INSTRUCTIONS
"Glasses prescription given - optional  May use artificial tears up to four times a day (like Refresh Optive, Systane Balance, TheraTears, or generic artificial tears are ok. Avoid \"get the red out\" drops).   Call in November 2023 for an appointment in March 2024 for Complete Exam    Dr. Ross [phone]      Patient Education   Diabetes weakens the blood vessels all over the body, including the eyes. Damage to the blood vessels in the eyes can cause swelling or bleeding into part of the eye (called the retina). This is called diabetic retinopathy (KATIE-tin--puh-thee). If not treated, this disease can cause vision loss or blindness.   Symptoms may include blurred or distorted vision, but many people have no symptoms. It's important to see your eye doctor regularly to check for problems.   Early treatment and good control can help protect your vision. Here are the things you can do to help prevent vision loss:      1. Keep your blood sugar levels under tight control.      2. Bring high blood pressure under control.      3. No smoking.      4. Have yearly dilated eye exams.       "

## 2023-03-24 NOTE — PROGRESS NOTES
" Current Eye Medications:  none     Subjective:  Here for complete exam. Having more trouble seeing in the distance. Right eye is worse than the left. Was supposed to patch as a child, but didn't wear the patch.     B 2023    Hemoglobin A1C   Date Value Ref Range Status   2022 9.3 (H) 0.0 - 5.6 % Final     Comment:     Normal <5.7%   Prediabetes 5.7-6.4%    Diabetes 6.5% or higher     Note: Adopted from ADA consensus guidelines.     Declined dilation today as has long drive and no .     Objective:  See Ophthalmology Exam.       Assessment:  Baseline eye exam in patient with diabetes and amblyopia right eye.  No obvious diabetic retinopathy.      ICD-10-CM    1. Type 2 diabetes mellitus with hyperglycemia, without long-term current use of insulin (H)  E11.65       2. Amblyopia, mild-moderate, of right eye  H53.001       3. Encounter for examination of eyes and vision with abnormal findings  Z01.01       4. Regular astigmatism of both eyes  H52.223            Plan:  Glasses prescription given - optional  May use artificial tears up to four times a day (like Refresh Optive, Systane Balance, TheraTears, or generic artificial tears are ok. Avoid \"get the red out\" drops).  Understands importance of dilation for optimal exam - will plan for next visit.  Call in 2023 for an appointment in 2024 for Complete Exam    Dr. Ross [phone]       "

## 2023-03-25 PROBLEM — H53.001 AMBLYOPIA OF RIGHT EYE: Status: ACTIVE | Noted: 2023-03-25

## 2023-04-15 ENCOUNTER — HEALTH MAINTENANCE LETTER (OUTPATIENT)
Age: 33
End: 2023-04-15

## 2023-06-02 ENCOUNTER — HEALTH MAINTENANCE LETTER (OUTPATIENT)
Age: 33
End: 2023-06-02

## 2023-10-17 ENCOUNTER — OFFICE VISIT (OUTPATIENT)
Dept: FAMILY MEDICINE | Facility: CLINIC | Age: 33
End: 2023-10-17
Payer: COMMERCIAL

## 2023-10-17 VITALS
WEIGHT: 213.9 LBS | HEART RATE: 100 BPM | OXYGEN SATURATION: 96 % | SYSTOLIC BLOOD PRESSURE: 131 MMHG | RESPIRATION RATE: 20 BRPM | DIASTOLIC BLOOD PRESSURE: 68 MMHG | BODY MASS INDEX: 30.69 KG/M2 | TEMPERATURE: 98.8 F

## 2023-10-17 DIAGNOSIS — R06.02 SOB (SHORTNESS OF BREATH): Primary | ICD-10-CM

## 2023-10-17 PROCEDURE — 99214 OFFICE O/P EST MOD 30 MIN: CPT | Performed by: PHYSICIAN ASSISTANT

## 2023-10-17 PROCEDURE — 87635 SARS-COV-2 COVID-19 AMP PRB: CPT | Performed by: PHYSICIAN ASSISTANT

## 2023-10-17 RX ORDER — BENZONATATE 100 MG/1
100 CAPSULE ORAL 3 TIMES DAILY PRN
Qty: 21 CAPSULE | Refills: 0 | Status: SHIPPED | OUTPATIENT
Start: 2023-10-17

## 2023-10-17 RX ORDER — ALBUTEROL SULFATE 0.83 MG/ML
2.5 SOLUTION RESPIRATORY (INHALATION) EVERY 6 HOURS PRN
Qty: 90 ML | Refills: 0 | Status: SHIPPED | OUTPATIENT
Start: 2023-10-17

## 2023-10-17 RX ORDER — ALBUTEROL SULFATE 90 UG/1
2 AEROSOL, METERED RESPIRATORY (INHALATION) EVERY 6 HOURS PRN
Qty: 18 G | Refills: 0 | Status: SHIPPED | OUTPATIENT
Start: 2023-10-17

## 2023-10-17 ASSESSMENT — ENCOUNTER SYMPTOMS
SORE THROAT: 0
WHEEZING: 0
FATIGUE: 1
SHORTNESS OF BREATH: 1
FEVER: 1

## 2023-10-17 NOTE — LETTER
October 17, 2023      Bryan Arayaflash  [address]  [place] [address]        To Whom It May Concern:    Bryan Grant  was seen on 10/17/23.  Please excuse his absence from work tomorrow due to illness.        Sincerely,        Debra Chi PA-C

## 2023-10-18 LAB — SARS-COV-2 RNA RESP QL NAA+PROBE: NEGATIVE

## 2023-10-18 NOTE — PATIENT INSTRUCTIONS
Take Tessalon Perles as needed for cough suppression  You will be called with the COVID test results if they are positive.  Check MyChart for them.  This result usually takes 12 to 24 hours.  Seek emergency medical attention if you are experiencing worsening shortness of breath or chest pain.  May use albuterol neb or inhaler every 6 hours as needed for wheezing or shortness of breath.

## 2023-10-18 NOTE — PROGRESS NOTES
Patient presents with:  Cough: Started last night stuffy nose,fever,sneezing and sob       Clinical Decision Making:  Patient reporting shortness of breath.  Lungs are CTA and patient is vitally stable.  No pleuritic chest pains majorly concerning for PE.  Patient sick symptoms to started yesterday and pneumonia is unlikely.  We do not have x-ray capabilities at this time and I do not feel it is necessary.  I did state that if he is experiencing any worsening shortness of breath he should be seen directly at the emergency department.  Patient requesting nebulizer machine.  I did inform him that he should be capable of using albuterol inhaler with a spacer, but he insisted on having machine.    ICD-10-CM    1. SOB (shortness of breath)  R06.02 benzonatate (TESSALON) 100 MG capsule     albuterol (PROAIR HFA/PROVENTIL HFA/VENTOLIN HFA) 108 (90 Base) MCG/ACT inhaler     Symptomatic COVID-19 Virus (Coronavirus) by PCR Nose     albuterol (PROVENTIL) (2.5 MG/3ML) 0.083% neb solution     Nebulizer and Supplies Order for DME - ONLY FOR DME          Patient Instructions   Take Tessalon Perles as needed for cough suppression  You will be called with the COVID test results if they are positive.  Check MyChart for them.  This result usually takes 12 to 24 hours.  Seek emergency medical attention if you are experiencing worsening shortness of breath or chest pain.  May use albuterol neb or inhaler every 6 hours as needed for wheezing or shortness of breath.    HPI:  Bryan Grant is a 33 year old male who presents today complaining of cough, stuffy nose, fever, sneezing, and shortness of breath.  Patient also states that he has decrease sense of taste and smell.  He tried to do an at-home COVID test, but he is not sure of its accuracy.  He is interested in another COVID test today.  He denies any chest pain.    History obtained from the patient.    Problem List:  2023-03: Amblyopia, mild-moderate, of right eye  2022-04:  Class 1 obesity due to excess calories without serious   comorbidity with body mass index (BMI) of 33.0 to 33.9 in adult  -04: Dry skin  2017: Recurrent major depressive disorder (H24)  2017: Hyperlipidemia  2017: Essential hypertension  2017: Type 2 diabetes mellitus (H)  2017: Tobacco dependence syndrome  2015: Shortness of breath  2015: Generalized anxiety disorder      Past Medical History:   Diagnosis Date    Amblyopia     Anxiety     Essential hypertension 2017    Overview:  Hypertension (HTN) NOS Overview:  Overview:  Hypertension (HTN) NOS     Hyperlipidemia 2017    Overview:  Previously declined intervention. Overview:  Overview:  Previously declined intervention.     Recurrent major depressive disorder (H24) 2017    Overview:  Generalized anxiety disorder, panic disorder, Major depressive disorder,  recurrent, moderate, Rule out personality disorder per Psych. Zoloft 100  mg 1 tablet daily and Ativan 0.5 mg 1 tablet up to 2 times a day as needed  for episodes of severe anxiety or panic attacks prescribed by psych.   Continue to follow with psych/Behavioral Health.    Overview:  Overview:  Generalized anxiety     Shortness of breath 2015    Tobacco dependence syndrome 2017    Type 2 diabetes mellitus (H) 2017    Overview:  Diabetes Mellitus Type 2 Overview:  Overview:  Diabetes Mellitus Type 2        Social History     Tobacco Use    Smoking status: Some Days     Types: Cigarettes     Last attempt to quit: 2019     Years since quittin.7    Smokeless tobacco: Never   Substance Use Topics    Alcohol use: No       Review of Systems   Constitutional:  Positive for fatigue and fever.   HENT:  Positive for congestion. Negative for sore throat.    Respiratory:  Positive for shortness of breath. Negative for wheezing.    Cardiovascular:  Negative for chest pain.       Vitals:    10/17/23 1939   BP: 131/68   BP Location: Right arm   Patient  Position: Sitting   Cuff Size: Adult Regular   Pulse: 100   Resp: 20   Temp: 98.8  F (37.1  C)   TempSrc: Oral   SpO2: 96%   Weight: 97 kg (213 lb 14.4 oz)       Physical Exam  Vitals and nursing note reviewed.   Constitutional:       General: He is not in acute distress.     Appearance: He is not toxic-appearing or diaphoretic.   HENT:      Head: Normocephalic and atraumatic.      Right Ear: External ear normal.      Left Ear: External ear normal.   Eyes:      Conjunctiva/sclera: Conjunctivae normal.   Cardiovascular:      Rate and Rhythm: Normal rate and regular rhythm.      Heart sounds: No murmur heard.  Pulmonary:      Effort: Pulmonary effort is normal. No respiratory distress.      Breath sounds: No stridor. No wheezing, rhonchi or rales.   Neurological:      Mental Status: He is alert.   Psychiatric:         Mood and Affect: Mood normal.         Behavior: Behavior normal.         Thought Content: Thought content normal.         Judgment: Judgment normal.         At the end of the encounter, I discussed results, diagnosis, medications. Discussed red flags for immediate return to clinic/ER, as well as indications for follow up if no improvement. Patient understood and agreed to plan. Patient was stable for discharge.

## 2023-10-24 ENCOUNTER — OFFICE VISIT (OUTPATIENT)
Dept: FAMILY MEDICINE | Facility: CLINIC | Age: 33
End: 2023-10-24
Payer: COMMERCIAL

## 2023-10-24 VITALS
OXYGEN SATURATION: 98 % | RESPIRATION RATE: 18 BRPM | DIASTOLIC BLOOD PRESSURE: 81 MMHG | HEART RATE: 90 BPM | SYSTOLIC BLOOD PRESSURE: 117 MMHG | TEMPERATURE: 98.8 F | BODY MASS INDEX: 30.72 KG/M2 | WEIGHT: 214.1 LBS

## 2023-10-24 DIAGNOSIS — J06.9 VIRAL URI: Primary | ICD-10-CM

## 2023-10-24 LAB — SARS-COV-2 RNA RESP QL NAA+PROBE: NEGATIVE

## 2023-10-24 PROCEDURE — 87635 SARS-COV-2 COVID-19 AMP PRB: CPT | Performed by: NURSE PRACTITIONER

## 2023-10-24 PROCEDURE — 99213 OFFICE O/P EST LOW 20 MIN: CPT | Performed by: NURSE PRACTITIONER

## 2023-10-24 ASSESSMENT — ENCOUNTER SYMPTOMS
SORE THROAT: 1
CHILLS: 0
SINUS PRESSURE: 1
SHORTNESS OF BREATH: 1
DIAPHORESIS: 0
COUGH: 1
FEVER: 0

## 2023-10-24 NOTE — PROGRESS NOTES
Assessment & Plan     Viral URI    - Symptomatic COVID-19 Virus (Coronavirus) by PCR       Focused exam and history done due to COVID-19 pandemic in a walk-in setting.      History, exam, and vital signs consistent with a viral URI.  Exposures to kids who are all sick.    No red flags.     Isolate pending covid results.  Posterior neck pain and head pain shooting up from this.  Is able to move neck around normally.  No fevers.  Recommended scheduling his preferred pain medication.    Recheck if shortness of breath or new fevers develop.  Rest.     OTCs recommended: None [   ].  Dextromethorphan  [ x ], guaifenesin [  x], pseudoephedrine [   ], Afrin for no greater than 3 days [  ], Tylenol or ibuprofen [  x].                Return in about 10 days (around 11/3/2023) for If no better.    Nicole Shankar, [facility] Richardson is a 33 year old male who presents to clinic today for the following health issues:  Chief Complaint   Patient presents with    Sinus Problem     Having headaches x 2 days, pain back of head, pain when just laying down or coughing, congested and sinus pressure x couple more days before headache, coughing (dry cough), no fever     HPI    Cough, congestion, sinus pressure and headache posterior head for total of 4 days.  Headache for the last 2 days. Worse with coughing.      Has tried excedrin since last night.  No cough medicine.      No asthma.     No covid test.  All kids have coughs, 1 has pink eye.            Review of Systems   Constitutional:  Negative for chills, diaphoresis and fever.   HENT:  Positive for congestion, sinus pressure and sore throat. Negative for ear pain.    Respiratory:  Positive for cough and shortness of breath (only with coughing).            Objective    /81   Pulse 90   Temp 98.8  F (37.1  C) (Oral)   Resp 18   Wt 97.1 kg (214 lb 1.6 oz)   SpO2 98%   BMI 30.72 kg/m    Physical Exam  Constitutional:        Appearance: Normal appearance.   HENT:      Nose: Congestion and rhinorrhea present.      Right Sinus: No maxillary sinus tenderness or frontal sinus tenderness.      Left Sinus: No maxillary sinus tenderness or frontal sinus tenderness.      Mouth/Throat:      Pharynx: Posterior oropharyngeal erythema present.      Tonsils: No tonsillar exudate. 1+ on the right. 1+ on the left.   Pulmonary:      Effort: Pulmonary effort is normal.      Breath sounds: No wheezing.   Musculoskeletal:      Cervical back: Normal range of motion. Tenderness (Posterior neck. Able to move head normally.) present. No rigidity.   Skin:     General: Skin is warm and dry.   Neurological:      Mental Status: He is alert.   Psychiatric:         Mood and Affect: Mood normal.

## 2023-10-24 NOTE — PATIENT INSTRUCTIONS
You are experiencing common virus symptoms. Viruses take 2-3 weeks to resolve on average.      Try over-the-counter cough and cold medication as needed such as Robitussin/Dayquil or Mucinex DM ibuprofen for discomfort.  SCHEDULE YOUR PAIN MEDICINE.  Sleep as much as possible.  Sinus rinses if you want.      Recheck if high fevers, shortness of breath or not better in about 10 days.      COVID test.  [facility]  Isolate away from others until you get your result.

## 2023-10-24 NOTE — LETTER
October 24, 2023      Bryan Grant  [address]  [place] [address]        To Whom It May Concern:    Bryan Grant  was seen on 10/24.  Please excuse him  until 10/25 due to illness or as per CDC dependent on covid test result.        Sincerely,        Nicole Shankar, CNP

## 2023-11-25 ENCOUNTER — HEALTH MAINTENANCE LETTER (OUTPATIENT)
Age: 33
End: 2023-11-25

## 2024-06-22 ENCOUNTER — HEALTH MAINTENANCE LETTER (OUTPATIENT)
Age: 34
End: 2024-06-22

## 2024-09-07 ENCOUNTER — HOSPITAL ENCOUNTER (OUTPATIENT)
Dept: GENERAL RADIOLOGY | Facility: HOSPITAL | Age: 34
Discharge: HOME | End: 2024-09-07
Attending: PHYSICIAN ASSISTANT | Admitting: PHYSICIAN ASSISTANT

## 2024-09-07 ENCOUNTER — OFFICE VISIT (OUTPATIENT)
Dept: FAMILY MEDICINE | Facility: CLINIC | Age: 34
End: 2024-09-07

## 2024-09-07 VITALS
TEMPERATURE: 98.4 F | RESPIRATION RATE: 18 BRPM | OXYGEN SATURATION: 97 % | DIASTOLIC BLOOD PRESSURE: 90 MMHG | SYSTOLIC BLOOD PRESSURE: 136 MMHG | WEIGHT: 220 LBS | HEART RATE: 86 BPM | BODY MASS INDEX: 31.57 KG/M2

## 2024-09-07 DIAGNOSIS — R10.12 LUQ ABDOMINAL PAIN: ICD-10-CM

## 2024-09-07 DIAGNOSIS — R10.12 LUQ ABDOMINAL PAIN: Primary | ICD-10-CM

## 2024-09-07 LAB
ALBUMIN SERPL BCG-MCNC: 4.7 G/DL (ref 3.5–5.2)
ALP SERPL-CCNC: 77 U/L (ref 40–150)
ALT SERPL W P-5'-P-CCNC: 38 U/L (ref 0–70)
ANION GAP SERPL CALCULATED.3IONS-SCNC: 12 MMOL/L (ref 7–15)
AST SERPL W P-5'-P-CCNC: 20 U/L (ref 0–45)
BASOPHILS # BLD AUTO: 0 10E3/UL (ref 0–0.2)
BASOPHILS NFR BLD AUTO: 0 %
BILIRUB SERPL-MCNC: 0.3 MG/DL
BUN SERPL-MCNC: 13.9 MG/DL (ref 6–20)
CALCIUM SERPL-MCNC: 9.8 MG/DL (ref 8.8–10.4)
CHLORIDE SERPL-SCNC: 102 MMOL/L (ref 98–107)
CREAT SERPL-MCNC: 0.86 MG/DL (ref 0.67–1.17)
EGFRCR SERPLBLD CKD-EPI 2021: >90 ML/MIN/1.73M2
EOSINOPHIL # BLD AUTO: 0 10E3/UL (ref 0–0.7)
EOSINOPHIL NFR BLD AUTO: 0 %
ERYTHROCYTE [DISTWIDTH] IN BLOOD BY AUTOMATED COUNT: 11.5 % (ref 10–15)
GLUCOSE SERPL-MCNC: 210 MG/DL (ref 70–99)
HBA1C MFR BLD: 8.2 % (ref 0–5.6)
HCO3 SERPL-SCNC: 26 MMOL/L (ref 22–29)
HCT VFR BLD AUTO: 47.9 % (ref 40–53)
HGB BLD-MCNC: 16.5 G/DL (ref 13.3–17.7)
IMM GRANULOCYTES # BLD: 0 10E3/UL
IMM GRANULOCYTES NFR BLD: 0 %
LIPASE SERPL-CCNC: 24 U/L (ref 13–60)
LYMPHOCYTES # BLD AUTO: 2.5 10E3/UL (ref 0.8–5.3)
LYMPHOCYTES NFR BLD AUTO: 34 %
MCH RBC QN AUTO: 29.8 PG (ref 26.5–33)
MCHC RBC AUTO-ENTMCNC: 34.4 G/DL (ref 31.5–36.5)
MCV RBC AUTO: 87 FL (ref 78–100)
MONOCYTES # BLD AUTO: 0.5 10E3/UL (ref 0–1.3)
MONOCYTES NFR BLD AUTO: 7 %
NEUTROPHILS # BLD AUTO: 4.3 10E3/UL (ref 1.6–8.3)
NEUTROPHILS NFR BLD AUTO: 59 %
PLATELET # BLD AUTO: 261 10E3/UL (ref 150–450)
POTASSIUM SERPL-SCNC: 4.8 MMOL/L (ref 3.4–5.3)
PROT SERPL-MCNC: 7.4 G/DL (ref 6.4–8.3)
RBC # BLD AUTO: 5.53 10E6/UL (ref 4.4–5.9)
SODIUM SERPL-SCNC: 140 MMOL/L (ref 135–145)
WBC # BLD AUTO: 7.3 10E3/UL (ref 4–11)

## 2024-09-07 PROCEDURE — 99214 OFFICE O/P EST MOD 30 MIN: CPT | Performed by: PHYSICIAN ASSISTANT

## 2024-09-07 PROCEDURE — 36415 COLL VENOUS BLD VENIPUNCTURE: CPT | Performed by: PHYSICIAN ASSISTANT

## 2024-09-07 PROCEDURE — 85025 COMPLETE CBC W/AUTO DIFF WBC: CPT | Performed by: PHYSICIAN ASSISTANT

## 2024-09-07 PROCEDURE — 80053 COMPREHEN METABOLIC PANEL: CPT | Performed by: PHYSICIAN ASSISTANT

## 2024-09-07 PROCEDURE — 83036 HEMOGLOBIN GLYCOSYLATED A1C: CPT | Performed by: PHYSICIAN ASSISTANT

## 2024-09-07 PROCEDURE — 71046 X-RAY EXAM CHEST 2 VIEWS: CPT

## 2024-09-07 PROCEDURE — 83690 ASSAY OF LIPASE: CPT | Performed by: PHYSICIAN ASSISTANT

## 2024-09-07 ASSESSMENT — PAIN SCALES - GENERAL: PAINLEVEL: NO PAIN (0)

## 2024-09-07 NOTE — PATIENT INSTRUCTIONS
You will be able to see your lab and xray results on [facility]. If there is anything that requires a treatment I will call you.   Otherwise for now I recommend Tylenol 1000 mg every 6 hours as needed for pain.

## 2024-09-07 NOTE — PROGRESS NOTES
Patient presents with:  Chest Pain: Patient was riding his bike and having chest pain for today. Patient states he is a diabetic, he would like to check his blood sugars.      Clinical Decision Making:  Left upper quadrant discomfort right under the left rib cage.  Abdominal exam is reassuring and not concerning for acute abdomen.  CBC is WNL.  CMP and lipase are normal and unlikely pancreatitis, cholecystitis, bowel obstruction.  Chest x-ray clear.  No evidence of pneumonia or lung masses.  Consider possibility for gas, GERD, generalized indigestion, precordial catch syndrome.      ICD-10-CM    1. LUQ abdominal pain  R10.12 CBC with platelets and differential     Comprehensive metabolic panel (BMP + Alb, Alk Phos, ALT, AST, Total. Bili, TP)     XR Chest 2 Views     Lipase     Hemoglobin A1c     CBC with platelets and differential     Comprehensive metabolic panel (BMP + Alb, Alk Phos, ALT, AST, Total. Bili, TP)     Lipase     Hemoglobin A1c          Patient Instructions   You will be able to see your lab and xray results on [facility]. If there is anything that requires a treatment I will call you.   Otherwise for now I recommend Tylenol 1000 mg every 6 hours as needed for pain.     HPI:  Bryan Grant is a 34 year old male with past medical history of obesity, hyperlipidemia, hypertension, type 2 diabetes, tobacco dependence, and DOUG who presents today with concerns of left upper quadrant abdominal discomfort just under the left-sided rib cage.  He suffers from a chronic cough due to tobacco dependence, but no significant changes in this and no increased productivity.  He denies any fevers or chills.  No nausea or vomiting.  Patient states that he drinks a couple of times per month.  No prior abdominal surgery history.    History obtained from the patient.    Problem List:  2023-03: Amblyopia, mild-moderate, of right eye  2022-04: Class 1 obesity due to excess calories without serious   comorbidity with body  mass index (BMI) of 33.0 to 33.9 in adult  -04: Dry skin  2017: Recurrent major depressive disorder (H24)  2017: Hyperlipidemia  2017: Essential hypertension  2017: Type 2 diabetes mellitus (H)  2017: Tobacco dependence syndrome  2015: Shortness of breath  2015: Generalized anxiety disorder      Past Medical History:   Diagnosis Date    Amblyopia     Anxiety     Essential hypertension 2017    Overview:  Hypertension (HTN) NOS Overview:  Overview:  Hypertension (HTN) NOS     Hyperlipidemia 2017    Overview:  Previously declined intervention. Overview:  Overview:  Previously declined intervention.     Recurrent major depressive disorder (H24) 2017    Overview:  Generalized anxiety disorder, panic disorder, Major depressive disorder,  recurrent, moderate, Rule out personality disorder per Psych. Zoloft 100  mg 1 tablet daily and Ativan 0.5 mg 1 tablet up to 2 times a day as needed  for episodes of severe anxiety or panic attacks prescribed by psych.   Continue to follow with psych/Behavioral Health.    Overview:  Overview:  Generalized anxiety     Shortness of breath 2015    Tobacco dependence syndrome 2017    Type 2 diabetes mellitus (H) 2017    Overview:  Diabetes Mellitus Type 2 Overview:  Overview:  Diabetes Mellitus Type 2        Social History     Tobacco Use    Smoking status: Some Days     Current packs/day: 0.00     Types: Cigarettes     Last attempt to quit: 2019     Years since quittin.6    Smokeless tobacco: Never   Substance Use Topics    Alcohol use: No         Review of Systems    Vitals:    24 1527   BP: (!) 136/90   BP Location: Right arm   Patient Position: Chair   Cuff Size: Adult Large   Pulse: 86   Resp: 18   Temp: 98.4  F (36.9  C)   TempSrc: Oral   SpO2: 97%   Weight: 99.8 kg (220 lb)       Physical Exam  Vitals and nursing note reviewed.   Constitutional:       General: He is not in acute distress.     Appearance: He is  not toxic-appearing or diaphoretic.   HENT:      Head: Normocephalic and atraumatic.      Right Ear: External ear normal.      Left Ear: External ear normal.   Eyes:      Conjunctiva/sclera: Conjunctivae normal.   Cardiovascular:      Rate and Rhythm: Normal rate and regular rhythm.      Heart sounds: No murmur heard.  Pulmonary:      Effort: Pulmonary effort is normal. No respiratory distress.      Breath sounds: Normal breath sounds. No stridor. No wheezing, rhonchi or rales.   Abdominal:      General: Abdomen is flat. There is no distension.      Palpations: Abdomen is soft.      Tenderness: There is abdominal tenderness (Generalized, but mostly present in the left upper quadrant and epigastric region). There is no right CVA tenderness, guarding or rebound.   Neurological:      Mental Status: He is alert.   Psychiatric:         Mood and Affect: Mood normal.         Behavior: Behavior normal.         Thought Content: Thought content normal.         Judgment: Judgment normal.         Results:  Results for orders placed or performed during the hospital encounter of 09/07/24   XR Chest 2 Views     Status: None    Narrative    EXAM: XR CHEST 2 VIEWS  LOCATION: [facility]  DATE: 9/7/2024    INDICATION: LUQ abdomainal pain  COMPARISON: 10/22/2022      Impression    IMPRESSION: Negative chest.   Results for orders placed or performed in visit on 09/07/24   Comprehensive metabolic panel (BMP + Alb, Alk Phos, ALT, AST, Total. Bili, TP)     Status: Abnormal   Result Value Ref Range    Sodium 140 135 - 145 mmol/L    Potassium 4.8 3.4 - 5.3 mmol/L    Carbon Dioxide (CO2) 26 22 - 29 mmol/L    Anion Gap 12 7 - 15 mmol/L    Urea Nitrogen 13.9 6.0 - 20.0 mg/dL    Creatinine 0.86 0.67 - 1.17 mg/dL    GFR Estimate >90 >60 mL/min/1.73m2    Calcium 9.8 8.8 - 10.4 mg/dL    Chloride 102 98 - 107 mmol/L    Glucose 210 (H) 70 - 99 mg/dL    Alkaline Phosphatase 77 40 - 150 U/L    AST 20 0 - 45 U/L    ALT 38 0 - 70  U/L    Protein Total 7.4 6.4 - 8.3 g/dL    Albumin 4.7 3.5 - 5.2 g/dL    Bilirubin Total 0.3 <=1.2 mg/dL   Lipase     Status: Normal   Result Value Ref Range    Lipase 24 13 - 60 U/L   Hemoglobin A1c     Status: Abnormal   Result Value Ref Range    Hemoglobin A1C 8.2 (H) 0.0 - 5.6 %   CBC with platelets and differential     Status: None   Result Value Ref Range    WBC Count 7.3 4.0 - 11.0 10e3/uL    RBC Count 5.53 4.40 - 5.90 10e6/uL    Hemoglobin 16.5 13.3 - 17.7 g/dL    Hematocrit 47.9 40.0 - 53.0 %    MCV 87 78 - 100 fL    MCH 29.8 26.5 - 33.0 pg    MCHC 34.4 31.5 - 36.5 g/dL    RDW 11.5 10.0 - 15.0 %    Platelet Count 261 150 - 450 10e3/uL    % Neutrophils 59 %    % Lymphocytes 34 %    % Monocytes 7 %    % Eosinophils 0 %    % Basophils 0 %    % Immature Granulocytes 0 %    Absolute Neutrophils 4.3 1.6 - 8.3 10e3/uL    Absolute Lymphocytes 2.5 0.8 - 5.3 10e3/uL    Absolute Monocytes 0.5 0.0 - 1.3 10e3/uL    Absolute Eosinophils 0.0 0.0 - 0.7 10e3/uL    Absolute Basophils 0.0 0.0 - 0.2 10e3/uL    Absolute Immature Granulocytes 0.0 <=0.4 10e3/uL   CBC with platelets and differential     Status: None    Narrative    The following orders were created for panel order CBC with platelets and differential.  Procedure                               Abnormality         Status                     ---------                               -----------         ------                     CBC with platelets and d...[[id number]]                      Final result                 Please view results for these tests on the individual orders.         At the end of the encounter, I discussed results, diagnosis, medications. Discussed red flags for immediate return to clinic/ER, as well as indications for follow up if no improvement. Patient understood and agreed to plan. Patient was stable for discharge.

## 2024-11-16 ENCOUNTER — APPOINTMENT (OUTPATIENT)
Dept: CT IMAGING | Facility: HOSPITAL | Age: 34
End: 2024-11-16
Attending: EMERGENCY MEDICINE

## 2024-11-16 ENCOUNTER — APPOINTMENT (OUTPATIENT)
Dept: RADIOLOGY | Facility: HOSPITAL | Age: 34
End: 2024-11-16
Attending: EMERGENCY MEDICINE

## 2024-11-16 ENCOUNTER — HOSPITAL ENCOUNTER (EMERGENCY)
Facility: HOSPITAL | Age: 34
Discharge: HOME | End: 2024-11-16
Attending: EMERGENCY MEDICINE | Admitting: EMERGENCY MEDICINE

## 2024-11-16 VITALS
RESPIRATION RATE: 19 BRPM | SYSTOLIC BLOOD PRESSURE: 199 MMHG | TEMPERATURE: 97.7 F | OXYGEN SATURATION: 94 % | BODY MASS INDEX: 32.27 KG/M2 | HEART RATE: 75 BPM | WEIGHT: 224.9 LBS | DIASTOLIC BLOOD PRESSURE: 115 MMHG

## 2024-11-16 VITALS
SYSTOLIC BLOOD PRESSURE: 152 MMHG | WEIGHT: 220 LBS | OXYGEN SATURATION: 98 % | BODY MASS INDEX: 32.58 KG/M2 | RESPIRATION RATE: 16 BRPM | HEIGHT: 69 IN | TEMPERATURE: 97.8 F | DIASTOLIC BLOOD PRESSURE: 95 MMHG | HEART RATE: 95 BPM

## 2024-11-16 DIAGNOSIS — F17.210 CIGARETTE SMOKER: ICD-10-CM

## 2024-11-16 DIAGNOSIS — I10 ELEVATED BLOOD PRESSURE READING WITH DIAGNOSIS OF HYPERTENSION: ICD-10-CM

## 2024-11-16 DIAGNOSIS — K08.89 PAIN, DENTAL: ICD-10-CM

## 2024-11-16 DIAGNOSIS — Z86.39 HISTORY OF DIABETES MELLITUS: ICD-10-CM

## 2024-11-16 DIAGNOSIS — I10 ACCELERATED HYPERTENSION: ICD-10-CM

## 2024-11-16 LAB
ALBUMIN SERPL BCG-MCNC: 4.8 G/DL (ref 3.5–5.2)
ALBUMIN UR-MCNC: NEGATIVE MG/DL
ALP SERPL-CCNC: 100 U/L (ref 40–150)
ALT SERPL W P-5'-P-CCNC: 32 U/L (ref 0–70)
ANION GAP SERPL CALCULATED.3IONS-SCNC: 12 MMOL/L (ref 7–15)
APPEARANCE UR: CLEAR
AST SERPL W P-5'-P-CCNC: 16 U/L (ref 0–45)
BASOPHILS # BLD AUTO: 0 10E3/UL (ref 0–0.2)
BASOPHILS NFR BLD AUTO: 0 %
BILIRUB DIRECT SERPL-MCNC: <0.2 MG/DL (ref 0–0.3)
BILIRUB SERPL-MCNC: 0.2 MG/DL
BILIRUB UR QL STRIP: NEGATIVE
BUN SERPL-MCNC: 14 MG/DL (ref 6–20)
CALCIUM SERPL-MCNC: 10.1 MG/DL (ref 8.8–10.4)
CHLORIDE SERPL-SCNC: 98 MMOL/L (ref 98–107)
COLOR UR AUTO: COLORLESS
CREAT SERPL-MCNC: 0.8 MG/DL (ref 0.67–1.17)
EGFRCR SERPLBLD CKD-EPI 2021: >90 ML/MIN/1.73M2
EOSINOPHIL # BLD AUTO: 0.1 10E3/UL (ref 0–0.7)
EOSINOPHIL NFR BLD AUTO: 1 %
ERYTHROCYTE [DISTWIDTH] IN BLOOD BY AUTOMATED COUNT: 11.6 % (ref 10–15)
GLUCOSE SERPL-MCNC: 286 MG/DL (ref 70–99)
GLUCOSE UR STRIP-MCNC: >1000 MG/DL
HCO3 SERPL-SCNC: 26 MMOL/L (ref 22–29)
HCT VFR BLD AUTO: 48 % (ref 40–53)
HGB BLD-MCNC: 16.6 G/DL (ref 13.3–17.7)
HGB UR QL STRIP: NEGATIVE
IMM GRANULOCYTES # BLD: 0 10E3/UL
IMM GRANULOCYTES NFR BLD: 0 %
KETONES UR STRIP-MCNC: NEGATIVE MG/DL
LEUKOCYTE ESTERASE UR QL STRIP: NEGATIVE
LYMPHOCYTES # BLD AUTO: 2.5 10E3/UL (ref 0.8–5.3)
LYMPHOCYTES NFR BLD AUTO: 27 %
MCH RBC QN AUTO: 29.7 PG (ref 26.5–33)
MCHC RBC AUTO-ENTMCNC: 34.6 G/DL (ref 31.5–36.5)
MCV RBC AUTO: 86 FL (ref 78–100)
MONOCYTES # BLD AUTO: 0.8 10E3/UL (ref 0–1.3)
MONOCYTES NFR BLD AUTO: 8 %
NEUTROPHILS # BLD AUTO: 6 10E3/UL (ref 1.6–8.3)
NEUTROPHILS NFR BLD AUTO: 64 %
NITRATE UR QL: NEGATIVE
NRBC # BLD AUTO: 0 10E3/UL
NRBC BLD AUTO-RTO: 0 /100
NT-PROBNP SERPL-MCNC: 61 PG/ML (ref 0–450)
PH UR STRIP: 6.5 [PH] (ref 5–7)
PLATELET # BLD AUTO: 296 10E3/UL (ref 150–450)
POTASSIUM SERPL-SCNC: 4.3 MMOL/L (ref 3.4–5.3)
PROT SERPL-MCNC: 7.5 G/DL (ref 6.4–8.3)
RBC # BLD AUTO: 5.59 10E6/UL (ref 4.4–5.9)
RBC URINE: 0 /HPF
SODIUM SERPL-SCNC: 136 MMOL/L (ref 135–145)
SP GR UR STRIP: 1.02 (ref 1–1.03)
TROPONIN T SERPL HS-MCNC: <6 NG/L
UROBILINOGEN UR STRIP-MCNC: <2 MG/DL
WBC # BLD AUTO: 9.4 10E3/UL (ref 4–11)
WBC URINE: <1 /HPF

## 2024-11-16 PROCEDURE — 64400 NJX AA&/STRD TRIGEMINAL NRV: CPT | Mod: LT | Performed by: EMERGENCY MEDICINE

## 2024-11-16 PROCEDURE — 83880 ASSAY OF NATRIURETIC PEPTIDE: CPT | Performed by: EMERGENCY MEDICINE

## 2024-11-16 PROCEDURE — 36415 COLL VENOUS BLD VENIPUNCTURE: CPT | Performed by: EMERGENCY MEDICINE

## 2024-11-16 PROCEDURE — 250N000013 HC RX MED GY IP 250 OP 250 PS 637: Performed by: EMERGENCY MEDICINE

## 2024-11-16 PROCEDURE — 85018 HEMOGLOBIN: CPT | Performed by: EMERGENCY MEDICINE

## 2024-11-16 PROCEDURE — 82040 ASSAY OF SERUM ALBUMIN: CPT | Performed by: EMERGENCY MEDICINE

## 2024-11-16 PROCEDURE — 71046 X-RAY EXAM CHEST 2 VIEWS: CPT

## 2024-11-16 PROCEDURE — 70450 CT HEAD/BRAIN W/O DYE: CPT

## 2024-11-16 PROCEDURE — 81003 URINALYSIS AUTO W/O SCOPE: CPT | Performed by: EMERGENCY MEDICINE

## 2024-11-16 PROCEDURE — 99284 EMERGENCY DEPT VISIT MOD MDM: CPT

## 2024-11-16 PROCEDURE — 93005 ELECTROCARDIOGRAM TRACING: CPT | Performed by: EMERGENCY MEDICINE

## 2024-11-16 PROCEDURE — 99285 EMERGENCY DEPT VISIT HI MDM: CPT | Mod: 25 | Performed by: EMERGENCY MEDICINE

## 2024-11-16 PROCEDURE — 80048 BASIC METABOLIC PNL TOTAL CA: CPT | Performed by: EMERGENCY MEDICINE

## 2024-11-16 PROCEDURE — 84155 ASSAY OF PROTEIN SERUM: CPT | Performed by: EMERGENCY MEDICINE

## 2024-11-16 PROCEDURE — 80053 COMPREHEN METABOLIC PANEL: CPT | Performed by: EMERGENCY MEDICINE

## 2024-11-16 PROCEDURE — 85025 COMPLETE CBC W/AUTO DIFF WBC: CPT | Performed by: EMERGENCY MEDICINE

## 2024-11-16 PROCEDURE — 84484 ASSAY OF TROPONIN QUANT: CPT | Performed by: EMERGENCY MEDICINE

## 2024-11-16 PROCEDURE — 250N000009 HC RX 250: Performed by: EMERGENCY MEDICINE

## 2024-11-16 RX ORDER — ACETAMINOPHEN 500 MG
1000 TABLET ORAL ONCE
Status: COMPLETED | OUTPATIENT
Start: 2024-11-16 | End: 2024-11-16

## 2024-11-16 RX ORDER — LISINOPRIL 5 MG/1
10 TABLET ORAL ONCE
Status: COMPLETED | OUTPATIENT
Start: 2024-11-16 | End: 2024-11-16

## 2024-11-16 RX ORDER — ACETAMINOPHEN 325 MG/1
650 TABLET ORAL ONCE
Status: COMPLETED | OUTPATIENT
Start: 2024-11-16 | End: 2024-11-16

## 2024-11-16 RX ORDER — LISINOPRIL 10 MG/1
10 TABLET ORAL DAILY
Qty: 30 TABLET | Refills: 0 | Status: SHIPPED | OUTPATIENT
Start: 2024-11-16 | End: 2024-12-16

## 2024-11-16 RX ORDER — LISINOPRIL 10 MG/1
10 TABLET ORAL DAILY
Qty: 30 TABLET | Refills: 0 | OUTPATIENT
Start: 2024-11-16 | End: 2024-12-16

## 2024-11-16 RX ORDER — HYDRALAZINE HYDROCHLORIDE 10 MG/1
10 TABLET, FILM COATED ORAL EVERY 8 HOURS PRN
Qty: 20 TABLET | Refills: 0 | Status: SHIPPED | OUTPATIENT
Start: 2024-11-16 | End: 2024-12-06

## 2024-11-16 RX ORDER — BUPIVACAINE HYDROCHLORIDE AND EPINEPHRINE 5; 5 MG/ML; UG/ML
1.8 INJECTION, SOLUTION PERINEURAL ONCE
Status: COMPLETED | OUTPATIENT
Start: 2024-11-16 | End: 2024-11-16

## 2024-11-16 RX ORDER — HYDRALAZINE HYDROCHLORIDE 10 MG/1
10 TABLET, FILM COATED ORAL ONCE
Status: COMPLETED | OUTPATIENT
Start: 2024-11-16 | End: 2024-11-16

## 2024-11-16 RX ORDER — ACETAMINOPHEN 325 MG/10.15ML
10 LIQUID ORAL ONCE
Status: DISCONTINUED | OUTPATIENT
Start: 2024-11-16 | End: 2024-11-16

## 2024-11-16 RX ADMIN — HYDRALAZINE HYDROCHLORIDE 10 MG: 10 TABLET, FILM COATED ORAL at 15:32

## 2024-11-16 RX ADMIN — AMOXICILLIN AND CLAVULANATE POTASSIUM 1 TABLET: 875; 125 TABLET, FILM COATED ORAL at 08:10

## 2024-11-16 RX ADMIN — LISINOPRIL 10 MG: 5 TABLET ORAL at 08:39

## 2024-11-16 RX ADMIN — ACETAMINOPHEN 1000 MG: 500 TABLET ORAL at 08:10

## 2024-11-16 RX ADMIN — BUPIVACAINE HYDROCHLORIDE AND EPINEPHRINE BITARTRATE 1.8 ML: 5; .005 INJECTION, SOLUTION SUBCUTANEOUS at 16:50

## 2024-11-16 RX ADMIN — ACETAMINOPHEN 650 MG: 325 TABLET ORAL at 15:32

## 2024-11-16 ASSESSMENT — COLUMBIA-SUICIDE SEVERITY RATING SCALE - C-SSRS
6. HAVE YOU EVER DONE ANYTHING, STARTED TO DO ANYTHING, OR PREPARED TO DO ANYTHING TO END YOUR LIFE?: NO
6. HAVE YOU EVER DONE ANYTHING, STARTED TO DO ANYTHING, OR PREPARED TO DO ANYTHING TO END YOUR LIFE?: NO
2. HAVE YOU ACTUALLY HAD ANY THOUGHTS OF KILLING YOURSELF IN THE PAST MONTH?: NO
1. IN THE PAST MONTH, HAVE YOU WISHED YOU WERE DEAD OR WISHED YOU COULD GO TO SLEEP AND NOT WAKE UP?: NO
2. HAVE YOU ACTUALLY HAD ANY THOUGHTS OF KILLING YOURSELF IN THE PAST MONTH?: NO
1. IN THE PAST MONTH, HAVE YOU WISHED YOU WERE DEAD OR WISHED YOU COULD GO TO SLEEP AND NOT WAKE UP?: NO

## 2024-11-16 ASSESSMENT — ACTIVITIES OF DAILY LIVING (ADL)
ADLS_ACUITY_SCORE: 0

## 2024-11-16 NOTE — ED PROVIDER NOTES
"  Emergency Department Encounter     Evaluation Date & Time:   11/16/2024  2:46 PM    CHIEF COMPLAINT:  Hypertension      Triage Note:Patient was seen here earlier today for dental pain and had high BP. He states he has a diagnosis of HTN but has not consistently taken his antihypertensives. He was given a dose of lisinopril while in ED during previous visit and has checked BP 4x since discharge. He states his BP continues to be very high which is concerning him. /121 in triage and he says he feels \"off\"    1400 - spoke with Dr. Beach at 1350 regarding patient; no orders at this time. /120. Patient now reporting headache and asking for tylenol   Triage Assessment (Adult)       Row Name 11/16/24 1307          Triage Assessment    Airway WDL WDL        Respiratory WDL    Respiratory WDL WDL        Cardiac WDL    Cardiac WDL WDL        Cognitive/Neuro/Behavioral WDL    Cognitive/Neuro/Behavioral WDL WDL                     Impression and Plan       FINAL IMPRESSION:    ICD-10-CM    1. Accelerated hypertension  I10 Primary Care Referral      2. Pain, dental  K08.89           ED COURSE & MEDICAL DECISION MAKING:  3:06 PM Met with patient for initial interview and exam.    34 year old male, history of DM2, HTN, obesity and tobacco use, who presents for evaluation of hypertension.     He was seen earlier today for dental pain and his blood pressure was elevated at that time. He has history of HTN, but has not been taking his lisinopril for at least 4 months.     Since his ED discharge, his blood pressures have remained elevated and he reports associated fatigue, \"feeling out of it\" with mild headache and nausea. He also has mild chest pain x 4 hours with shortness of breath and some lightheadedness.     On presentation, blood pressure elevated (181/121) with repeat higher (199/120).    Neuro exam is normal, however given headache with elevated blood pressures, imaging pursued and head CT was negative for " acute intracranial process.  I do not suspect SAH, meningitis or encephalitis and do not think LP is indicated.    EKG performed and demonstrated NSR with no ischemic changes. Troponin negative (<6); a single, normal troponin is reassuring that symptoms are not secondary to ACS given that they have been ongoing for 4 hours and I do not think serial troponin testing is indicated.    CXR demonstrated normal cardiomediastinal silhouette with no suspicious focal pulmonary opacities, significant pleural effusion or pneumothorax.     No clinical, radiographic or laboratory (BNP 61) evidence of acute CHF.    Labs otherwise remarkable for no leukocytosis, anemia, electrolyte derangements, renal or hepatic impairment.  UA negative for infection, hematuria and proteinuria.    Serum glucose is elevated (286) with no associated acidosis, ketosis or elevated anion gap to suggest DKA.    Patient given 10 mg po hydralazine with improvement in his blood pressures 140s-150s/70s-90s).      Patient requesting a dental block for his dental pain. Inferior alveolar nerve block administered, however unfortunately only provided anesthesia to his tongue.      I suspect dental pain is contributing to his elevated blood pressures.    Evaluation is reassuring and I do not think admission is indicated. Patient discharged to home with follow-up with the [facility] (a referral was ordered) and his Dental Clinic. He was given a prescription for hydralazine 10 mg to use Q8 hours, prn blood pressures >160 for one hour despite having taken his lisinopril.    Patient was given a prescription for lisinopril and Augmentin at his previous ED visit and he was advised to take these.    Return precautions provided. Patient stable throughout ED course.     At the conclusion of the encounter I discussed the results of all the tests and the disposition. The questions were answered. The patient acknowledged understanding and was agreeable with the  care plan.      Medical Decision Making    History:  Obtained supplemental history:Supplemental history obtained?: No  Reviewed external records: External records reviewed?: Documented in chart    External consultation:  Did you consider but not order tests?: Work up considered but not performed and documented in chart, if applicable  Did you interpret images independently?: Independent interpretation of ECG and images noted in documentation, when applicable.  Consultation discussion with other provider:Did you involve another provider (consultant, , pharmacy, etc.)?: No    Complicating factors:  Care impacted by chronic illness:Diabetes and Hypertension  Care significantly affected by social determinants of health:Access to Affordable Health Care and Medication Noncompliance    Disposition considerations: Discharge. I prescribed additional prescription strength medication(s) as charted. I considered admission, but ultimately discharged patient given reassuring evaluation and clinical improvement.      MEDICATIONS GIVEN IN THE EMERGENCY DEPARTMENT:  Medications   hydrALAZINE (APRESOLINE) tablet 10 mg (10 mg Oral $Given 11/16/24 1532)   acetaminophen (TYLENOL) tablet 650 mg (650 mg Oral $Given 11/16/24 1532)   BUPivacaine 0.5 % EPINEPHrine 1:200,000 dental injection SOLN 1.8 mL (1.8 mLs Intradermal $Given by Other 11/16/24 1650)       NEW PRESCRIPTIONS STARTED AT TODAY'S ED VISIT:  Discharge Medication List as of 11/16/2024  5:59 PM        START taking these medications    Details   hydrALAZINE (APRESOLINE) 10 MG tablet Take 1 tablet (10 mg) by mouth every 8 hours as needed for high blood pressure (systolic blood pressure greater than 160 for one hour despite having taken your lisinopril)., Disp-20 tablet, R-0, Local Print             HPI     The history is provided by the patient. No  was used.        Bryan Grant is a 34 year old male, history of DM2, HTN, obesity and tobacco use, who  "presents to this ED for evaluation of hypertension.     The patient was seen earlier today for dental pain and his blood pressure was elevated at that time. He has history of HTN, but has been off of his lisinopril for at least 4 months. He was given a prescription for lisinopril and advised to follow-up.    Since his ED discharge, his blood pressures have remained elevated and he reports associated fatigue and has been \"feeling out of it\". He reports associated mild headache with nausea. He also has had mild chest pain since ~11am (4 hours ago), which he describes as \"feeling funny\" with associated shortness of breath. He also has had some lightheadedness.     Per Chart Review, the patient was seen on 24 at [facility] Emergency Department for dental pain. He was quite hypertensive and has not taken his prescribed lisinopril since July. He was instructed to continue with lisinopril and to follow up with his primary. He was also evaluated for left lower mandibular dental pain, discharged on Augmentin.    Medical History     Past Medical History:   Diagnosis Date    Amblyopia     Anxiety     Essential hypertension 2017    Hyperlipidemia 2017    Recurrent major depressive disorder (H) 2017    Shortness of breath 2015    Tobacco dependence syndrome 2017    Type 2 diabetes mellitus (H) 2017       Past Surgical History:   Procedure Laterality Date    OTHER SURGICAL HISTORY      arm surgery       Family History   Problem Relation Age of Onset    Cerebrovascular Disease Father         just  of massive stroke last week       Social History     Tobacco Use    Smoking status: Some Days     Current packs/day: 0.00     Types: Cigarettes     Last attempt to quit: 2019     Years since quittin.8    Smokeless tobacco: Never   Vaping Use    Vaping status: Every Day    Substances: Nicotine    Devices: Disposable   Substance Use Topics    Alcohol use: No " "   Drug use: No       hydrALAZINE (APRESOLINE) 10 MG tablet  albuterol (PROAIR HFA/PROVENTIL HFA/VENTOLIN HFA) 108 (90 Base) MCG/ACT inhaler  albuterol (PROVENTIL) (2.5 MG/3ML) 0.083% neb solution  alcohol swab prep pads  ALPRAZolam (XANAX) 0.25 MG tablet  ammonium lactate (LAC-HYDRIN) 12 % external lotion  amoxicillin-clavulanate (AUGMENTIN) 875-125 MG tablet  benzonatate (TESSALON) 100 MG capsule  blood glucose (NO BRAND SPECIFIED) test strip  blood glucose calibration (NO BRAND SPECIFIED) solution  blood glucose monitoring (NO BRAND SPECIFIED) meter device kit  busPIRone (BUSPAR) 10 MG tablet  hydrOXYzine (ATARAX) 25 MG tablet  insulin glargine (LANTUS SOLOSTAR) 100 UNIT/ML pen  insulin lispro (HUMALOG KWIKPEN) 100 UNIT/ML (1 unit dial) KWIKPEN  insulin pen needle (31G X 6 MM) 31G X 6 MM miscellaneous  lisinopril (ZESTRIL) 10 MG tablet  lisinopril (ZESTRIL) 5 MG tablet  metFORMIN (GLUCOPHAGE) 1000 MG tablet  ondansetron (ZOFRAN ODT) 8 MG ODT tab  thin (NO BRAND SPECIFIED) lancets        Physical Exam     First Vitals:  Patient Vitals for the past 24 hrs:   BP Temp Temp src Pulse Resp SpO2 Height Weight   11/16/24 1732 (!) 152/95 -- -- 95 -- 98 % -- --   11/16/24 1622 (!) 141/73 -- -- 94 -- 94 % -- --   11/16/24 1553 (!) 179/102 -- -- 78 -- 97 % 1.753 m (5' 9\") 99.8 kg (220 lb)   11/16/24 1530 (!) 198/119 -- -- 80 -- 97 % -- --   11/16/24 1518 (!) 188/110 -- -- 81 -- 97 % -- --   11/16/24 1400 (!) 199/120 -- -- 85 -- -- -- --   11/16/24 1308 (!) 181/121 97.8  F (36.6  C) Oral 98 16 97 % -- --       PHYSICAL EXAM:   Physical Exam    GENERAL: Awake, alert.  In mild acute distress.   HEENT: Normocephalic, atraumatic. Pupils equal, round and reactive. Conjunctiva normal. EOMI without nystagmus. Tongue is midline. No facial swelling or trismus. Poor dentition. Tenderness of tooth #21.  NECK: No stridor.  PULMONARY: Symmetrical breath sounds without distress.  Lungs clear to auscultation bilaterally without wheezes, " rhonchi or rales.  CARDIO: Regular rate and rhythm.  No significant murmur, rub or gallop.  Radial pulses strong and symmetrical.  ABDOMINAL: Abdomen soft, non-distended and non-tender to palpation.    EXTREMITIES: No lower extremity swelling or edema.      NEURO: Alert and oriented to person, place and time.  Cranial nerves III-XII intact.  Strength 5/5 BL upper and lower extremities with sensation to light touch grossly intact.   PSYCH: Normal mood and affect.      Results     LAB:  All pertinent labs reviewed and interpreted  Labs Ordered and Resulted from Time of ED Arrival to Time of ED Departure   BASIC METABOLIC PANEL - Abnormal       Result Value    Sodium 136      Potassium 4.3      Chloride 98      Carbon Dioxide (CO2) 26      Anion Gap 12      Urea Nitrogen 14.0      Creatinine 0.80      GFR Estimate >90      Calcium 10.1      Glucose 286 (*)    ROUTINE UA WITH MICROSCOPIC REFLEX TO CULTURE - Abnormal    Color Urine Colorless      Appearance Urine Clear      Glucose Urine >1000 (*)     Bilirubin Urine Negative      Ketones Urine Negative      Specific Gravity Urine 1.020      Blood Urine Negative      pH Urine 6.5      Protein Albumin Urine Negative      Urobilinogen Urine <2.0      Nitrite Urine Negative      Leukocyte Esterase Urine Negative      RBC Urine 0      WBC Urine <1     HEPATIC FUNCTION PANEL - Normal    Protein Total 7.5      Albumin 4.8      Bilirubin Total 0.2      Alkaline Phosphatase 100      AST 16      ALT 32      Bilirubin Direct <0.20     TROPONIN T, HIGH SENSITIVITY - Normal    Troponin T, High Sensitivity <6     NT PROBNP INPATIENT - Normal    N terminal Pro BNP Inpatient 61     CBC WITH PLATELETS AND DIFFERENTIAL    WBC Count 9.4      RBC Count 5.59      Hemoglobin 16.6      Hematocrit 48.0      MCV 86      MCH 29.7      MCHC 34.6      RDW 11.6      Platelet Count 296      % Neutrophils 64      % Lymphocytes 27      % Monocytes 8      % Eosinophils 1      % Basophils 0      %  Immature Granulocytes 0      NRBCs per 100 WBC 0      Absolute Neutrophils 6.0      Absolute Lymphocytes 2.5      Absolute Monocytes 0.8      Absolute Eosinophils 0.1      Absolute Basophils 0.0      Absolute Immature Granulocytes 0.0      Absolute NRBCs 0.0         RADIOLOGY:  Chest XR,  PA & LAT   Final Result   IMPRESSION: PA and lateral views of the chest were obtained. Cardiomediastinal silhouette is within normal limits. No suspicious focal pulmonary opacities. No significant pleural effusion or pneumothorax.           Head CT w/o contrast   Final Result   IMPRESSION:   1.  No acute intracranial process.          EC2024, 15:17; NSR with rate of 81 bpm; normal intervals; normal conduction; no ST-T wave changes consistent with ACS or pericarditis; compared to previous EKG dated 10/22/2022, there are no significant changes    EKG independently reviewed and interpreted by Lizette Talavera MD      PROCEDURES:  Procedures:      PROCEDURE: Dental Nerve Block   INDICATIONS: Dental pain   PROCEDURE PROVIDER: Dr Lizette Talavera   SITE: Left Inferior Alveolar (mandibular) Nerve to achieve anesthesia of Tooth #21     MEDICATION: 1.8 mL of 0.5% Bupivacaine with epinephrine   NOTE: The usual mandibular landmarks were identified and the needle was positioned at the midpoint of the mandibular borders.  Area was aspirated and there was no return of blood.  I then injected the medication into the site.    COMPLICATIONS: Patient tolerated procedure well, with complication: tongue numbness without anesthesia achieved         I, Bryan Hayes, am serving as a scribe to document services personally performed by Lizette Talavera MD based on my observation and the provider's statements to me. I, Lizette Talavera MD attest that Bryan Hayes is acting in a scribe capacity, has observed my performance of the services and has documented them in accordance with my direction.    Lizette Talavera MD  Emergency Medicine  [facility]  [facility] EMERGENCY DEPARTMENT           Lizette Talavera MD  11/17/24 1723

## 2024-11-16 NOTE — ED PROVIDER NOTES
"EMERGENCY DEPARTMENT ENCOUNTER      NAME: Bryan Grant  AGE: 34 year old male  YOB: 1990  MRN: [id number]  EVALUATION DATE & TIME: 11/16/2024  7:32 AM    PCP: No Ref-Primary, Physician    ED PROVIDER: Alexandra Beach M.D.      CHIEF COMPLAINT     Chief Complaint   Patient presents with    Dental Pain         FINAL IMPRESSION:     1. Pain, dental    2. Elevated blood pressure reading with diagnosis of hypertension    3. Cigarette smoker    4. History of diabetes mellitus          MEDICAL DECISION MAKING:     ED Course as of 11/16/24 0830   Sat Nov 16, 2024   0815 34-year-old male presents complaining of left lower mandibular dental pain.  Tells me he has a history of bad teeth does not use any drugs but he does smoke.  Noted to be quite hypertensive.  Has a history of hypertension state has not taken any medications.   0815 Denies any other systemic symptoms.   0815 Examination he is nontoxic appears quite comfortable.  Poor dentition.  It with\" is the left lower mandibular premolar.  Tender to palpation.  No evidence of gingival abscess.  Uvula is midline.  No evidence of submandibular induration.  Neck is supple.  No evidence of Ludewig's angina   0816 Cardiopulmonary rhythm is regular.   0816 Differential diagnosis include cavities periapical abscess abscess Ludewig's angina deep tissue infection among others.   0816 The patient is quite hypertensive.  Initially reported no history of hypertension.  I was able to review his records from clinic in September.  Patient does have a history of hypertension diabetes that he has not taking any of his medications.   0816 Requested consult from the pharmacist.  Last prescription for lisinopril was in July.  Tells me he has not taking any of his medications have not filled the prescription.   0817 Will keep patient on his lisinopril.  Will start him document will give Tylenol.  Recommend that he follows up with his primary care.  He subsequently stated " that he does not have insurance therefore registration gave patient resources for medical assistance.   0826 Spoke with patient about smoking cessation.  Dental clinic numbers were given.  And was instructed to follow-up with his primary care doctor.       Medical Decision Making    History:  Supplemental history from: N/A  External Record(s) reviewed: [facility] 9/22/2020 for chest pain    Work Up:  Chart documentation includes differential considered and any EKGs or imaging independently interpreted by provider, where specified.  In additional to work up documented, I considered the following work up: Lab work and CT neck.  No meningeal signs afebrile no trismus no evidence of Ludewig's angina.    External consultation:  Discussion of management with another provider:     Complicating factors:  Care impacted by chronic illness: HTN, HLD, depression, DMT2, anxiety, tobacco dependence,       Disposition considerations: Discharge. I prescribed additional prescription strength medication(s) as charted. See documentation for any additional details.    Not Applicable        ED COURSE     7:38 AM Introduced myself to the patient, obtained history of present illness, and performed initial physical exam at this time.    8:28 AM updated.    At the conclusion of the encounter I discussed the results of all of the tests and the disposition. The questions were answered. The patient acknowledged understanding and was agreeable with the care plan.         MEDICATIONS GIVEN IN THE EMERGENCY:     Medications   lisinopril (ZESTRIL) tablet 10 mg (has no administration in time range)   amoxicillin-clavulanate (AUGMENTIN) 875-125 MG per tablet 1 tablet (1 tablet Oral $Given 11/16/24 0810)   acetaminophen (TYLENOL) tablet 1,000 mg (1,000 mg Oral $Given 11/16/24 0810)       NEW PRESCRIPTIONS STARTED AT TODAY'S ER VISIT     New Prescriptions    No medications on file           =================================================================    [facility]     Patient information was obtained from: the patient    Use of : N/A         Bryan Grant is a 34 year old male who presents by self.    Patient reports right mandibular tooth pain for the past two days. Denies trauma. He hasn't seen a dentist in a while. He smokes cigarettes.     Denies urinary symptoms, drug use.    He denies any chest pain shortness of breath abdominal pain vomiting diarrhea.  Blood pressure was elevated at triage.  He is not taking any medications.      REVIEW OF SYSTEMS   Review of Systems   SEE HPI    PAST MEDICAL HISTORY:     Past Medical History:   Diagnosis Date    Amblyopia     Anxiety     Essential hypertension 02/06/2017    Overview:  Hypertension (HTN) NOS Overview:  Overview:  Hypertension (HTN) NOS     Hyperlipidemia 02/27/2017    Overview:  Previously declined intervention. Overview:  Overview:  Previously declined intervention.     Recurrent major depressive disorder (H) 11/14/2017    Overview:  Generalized anxiety disorder, panic disorder, Major depressive disorder,  recurrent, moderate, Rule out personality disorder per Psych. Zoloft 100  mg 1 tablet daily and Ativan 0.5 mg 1 tablet up to 2 times a day as needed  for episodes of severe anxiety or panic attacks prescribed by psych.   Continue to follow with psych/Behavioral Health.    Overview:  Overview:  Generalized anxiety     Shortness of breath 03/04/2015    Tobacco dependence syndrome 02/06/2017    Type 2 diabetes mellitus (H) 02/06/2017    Overview:  Diabetes Mellitus Type 2 Overview:  Overview:  Diabetes Mellitus Type 2        PAST SURGICAL HISTORY:     Past Surgical History:   Procedure Laterality Date    OTHER SURGICAL HISTORY      arm surgery         CURRENT MEDICATIONS:   albuterol (PROAIR HFA/PROVENTIL HFA/VENTOLIN HFA) 108 (90 Base) MCG/ACT inhaler  albuterol (PROVENTIL) (2.5 MG/3ML) 0.083% neb solution  alcohol swab  prep pads  ALPRAZolam (XANAX) 0.25 MG tablet  ammonium lactate (LAC-HYDRIN) 12 % external lotion  benzonatate (TESSALON) 100 MG capsule  blood glucose (NO BRAND SPECIFIED) test strip  blood glucose calibration (NO BRAND SPECIFIED) solution  blood glucose monitoring (NO BRAND SPECIFIED) meter device kit  busPIRone (BUSPAR) 10 MG tablet  hydrOXYzine (ATARAX) 25 MG tablet  insulin glargine (LANTUS SOLOSTAR) 100 UNIT/ML pen  insulin lispro (HUMALOG KWIKPEN) 100 UNIT/ML (1 unit dial) KWIKPEN  insulin pen needle (31G X 6 MM) 31G X 6 MM miscellaneous  lisinopril (ZESTRIL) 5 MG tablet  metFORMIN (GLUCOPHAGE) 1000 MG tablet  ondansetron (ZOFRAN ODT) 8 MG ODT tab  thin (NO BRAND SPECIFIED) lancets         ALLERGIES:     Allergies   Allergen Reactions    Erythromycin Rash     Other reaction(s): as child    Erythromycin-Sulfisoxazole Unknown     Denies 22       FAMILY HISTORY:     Family History   Problem Relation Age of Onset    Cerebrovascular Disease Father         just  of massive stroke last week       SOCIAL HISTORY:     Social History     Socioeconomic History    Marital status: Single   Tobacco Use    Smoking status: Some Days     Current packs/day: 0.00     Types: Cigarettes     Last attempt to quit: 2019     Years since quittin.8    Smokeless tobacco: Never   Vaping Use    Vaping status: Every Day    Substances: Nicotine    Devices: Disposable   Substance and Sexual Activity    Alcohol use: No    Drug use: No    Sexual activity: Yes     Partners: Female     Social Drivers of Health     Food Insecurity: No Food Insecurity (3/4/2023)    [facility]    Hunger Vital Sign     Worried About Running Out of Food in the Last Year: Never true     Ran Out of Food in the Last Year: Never true   Interpersonal Safety: Not At Risk (2023)    Received from [facility]  IPV     Do you feel UNSAFE in any of your personal relationships with your family members or any other acquaintances?: No       VITALS:   BP (!) 198/124   Pulse 78   Temp 97.7  F (36.5  C)   Resp 19   Wt 102 kg (224 lb 14.4 oz)   SpO2 98%   BMI 32.27 kg/m      PHYSICAL EXAM     Physical Exam  HENT:      Mouth/Throat:        Comments: Pain with percussion of the first left mandibular premolar.  Missing the second mandibular premolar.  Multiple cavities.  Uvula is midline.  No trismus.  No induration or brawny endurance of the soft tissues of the mouth.        Physical Exam   Constitutional: Well-appearing    Head: Atraumatic.     Nose: Nose normal.     Mouth/Throat: Oropharynx is clear and moist. No abscess    Eyes: EOM are normal. Pupils are equal, round, and reactive to light.     Ears: Bilateral pearly white tympanic membranes.    Neck: Normal range of motion. Neck supple.     Cardiovascular: Normal rate, regular rhythm and normal heart sounds.  2+ femoral pulses/radial/DP pulses B    Pulmonary/Chest: Normal effort  and breath sounds normal.     Abdominal: soft nontender.    Musculoskeletal: Normal range of motion.     Neurological: Moves upper and lower extremities equally.    Lymphatics: no edema, no calves pain, no palpable cords.    : NA    Skin: Skin is warm and dry.     Psychiatric: Normal mood and affect. Behavior is normal.       LAB:     All pertinent labs reviewed and interpreted.  Labs Ordered and Resulted from Time of ED Arrival to Time of ED Departure - No data to display     RADIOLOGY:     Reviewed all pertinent imaging. Please see official radiology report.  No orders to display        EKG:       I have independently reviewed and interpreted the EKG(s) documented above.      PROCEDURES:     Procedures      I, Polo Fernandez, am serving as a scribe to document services personally performed by Dr. Beach based on my observation and the provider's statements to me. I, Alexandra Beach MD  attest that Polo Fernandez is acting in a scribe capacity, has observed my performance of the services and has documented them in accordance with my direction.    Alexandra Beach M.D.  Emergency Medicine  [facility] EMERGENCY DEPARTMENT  [address] [place] [id number] [phone]  Dept: [phone]       Alexandra Beach MD  11/16/24 0863

## 2024-11-16 NOTE — ED TRIAGE NOTES
"Patient was seen here earlier today for dental pain and had high BP. He states he has a diagnosis of HTN but has not consistently taken his antihypertensives. He was given a dose of lisinopril while in ED during previous visit and has checked BP 4x since discharge. He states his BP continues to be very high which is concerning him. /121 in triage and he says he feels \"off\"    1400 - spoke with Dr. Beach at 1350 regarding patient; no orders at this time. /120. Patient now reporting headache and asking for tylenol   Triage Assessment (Adult)       Row Name 11/16/24 1301          Triage Assessment    Airway WDL WDL        Respiratory WDL    Respiratory WDL WDL        Cardiac WDL    Cardiac WDL WDL        Cognitive/Neuro/Behavioral WDL    Cognitive/Neuro/Behavioral WDL WDL                     "

## 2024-11-16 NOTE — DISCHARGE INSTRUCTIONS
Please follow-up with the [facility] next week for a recheck and to establish primary care; call to arrange appointment.    Return to the ER for worsening symptoms, sudden onset or severe headache, focal neurologic deficit (for example, facial droop or right arm weakness), recurrent / worsening chest pain, shortness of breath, if you pass out or feel that you might, persistent nausea / vomiting or other concerns.     If your systolic blood pressure (upper number) is greater than 160 for one hour despite having taken your lisinopril, take one of the hydralazine - you can take one of these every 8 hours, as needed for elevated blood pressures.    Please follow-up with your Dental Clinic at the next earliest available appointment.  Also return to the ER for worsening dental pain, facial swelling, inability to fully open your mouth, fever or other concerns.  Complete the full course of antibiotics (Augmentin) prescribed at your previous visit.

## 2024-11-16 NOTE — ED TRIAGE NOTES
Here to be seen for dental pain left side of mouth. Multiple caries observed, some teeth broken off.     Needs dental resources.     Wants antibiotics.      Triage Assessment (Adult)       Row Name 11/16/24 0717          Triage Assessment    Airway WDL WDL        Respiratory WDL    Respiratory WDL WDL        Skin Circulation/Temperature WDL    Skin Circulation/Temperature WDL WDL        Cardiac WDL    Cardiac WDL WDL        Peripheral/Neurovascular WDL    Peripheral Neurovascular WDL WDL        Cognitive/Neuro/Behavioral WDL    Cognitive/Neuro/Behavioral WDL WDL

## 2024-11-16 NOTE — DISCHARGE INSTRUCTIONS
Read and Follow the discharge instructions.    Take the antibiotics as instructed.    Follow-up with the dental clinic for reevaluation.    You need to follow-up with your primary care doctor your blood pressure is elevated and you have not been taking your blood pressure medication.  Fill the prescription as instructed.

## 2024-11-27 LAB
ATRIAL RATE - MUSE: 81 BPM
DIASTOLIC BLOOD PRESSURE - MUSE: NORMAL MMHG
INTERPRETATION ECG - MUSE: NORMAL
P AXIS - MUSE: 43 DEGREES
PR INTERVAL - MUSE: 138 MS
QRS DURATION - MUSE: 82 MS
QT - MUSE: 346 MS
QTC - MUSE: 401 MS
R AXIS - MUSE: 84 DEGREES
SYSTOLIC BLOOD PRESSURE - MUSE: NORMAL MMHG
T AXIS - MUSE: 54 DEGREES
VENTRICULAR RATE- MUSE: 81 BPM

## 2025-03-08 ENCOUNTER — OFFICE VISIT (OUTPATIENT)
Dept: URGENT CARE | Facility: URGENT CARE | Age: 35
End: 2025-03-08

## 2025-03-08 ENCOUNTER — TELEPHONE (OUTPATIENT)
Dept: NURSING | Facility: CLINIC | Age: 35
End: 2025-03-08

## 2025-03-08 VITALS
SYSTOLIC BLOOD PRESSURE: 144 MMHG | RESPIRATION RATE: 16 BRPM | DIASTOLIC BLOOD PRESSURE: 89 MMHG | HEART RATE: 95 BPM | TEMPERATURE: 98.1 F | OXYGEN SATURATION: 97 % | WEIGHT: 219 LBS | BODY MASS INDEX: 32.34 KG/M2

## 2025-03-08 DIAGNOSIS — R06.02 SOB (SHORTNESS OF BREATH): ICD-10-CM

## 2025-03-08 DIAGNOSIS — M54.2 ACUTE NECK PAIN: Primary | ICD-10-CM

## 2025-03-08 DIAGNOSIS — R50.9 FEVER, UNSPECIFIED: ICD-10-CM

## 2025-03-08 LAB — DEPRECATED S PYO AG THROAT QL EIA: NEGATIVE

## 2025-03-08 PROCEDURE — 87651 STREP A DNA AMP PROBE: CPT | Performed by: STUDENT IN AN ORGANIZED HEALTH CARE EDUCATION/TRAINING PROGRAM

## 2025-03-08 PROCEDURE — 99213 OFFICE O/P EST LOW 20 MIN: CPT | Performed by: STUDENT IN AN ORGANIZED HEALTH CARE EDUCATION/TRAINING PROGRAM

## 2025-03-08 RX ORDER — ALBUTEROL SULFATE 90 UG/1
2 INHALANT RESPIRATORY (INHALATION) EVERY 6 HOURS PRN
Qty: 18 G | Refills: 3 | Status: SHIPPED | OUTPATIENT
Start: 2025-03-08 | End: 2025-03-08

## 2025-03-08 RX ORDER — ALBUTEROL SULFATE 90 UG/1
2 INHALANT RESPIRATORY (INHALATION) EVERY 6 HOURS PRN
Qty: 18 G | Refills: 3 | Status: SHIPPED | OUTPATIENT
Start: 2025-03-08

## 2025-03-09 LAB — S PYO DNA THROAT QL NAA+PROBE: NOT DETECTED

## 2025-03-09 NOTE — TELEPHONE ENCOUNTER
Telephone Encounter:    Patient calling to request prescription for albuterol to be transferred to pharmacy that is open, requested [facility] in [place].    Transferred per request. No additional concerns or questions.    Colette Lopes RN  03/08/25 8:56 PM  [facility] Nurse Advisor
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
## 2025-03-16 ENCOUNTER — HOSPITAL ENCOUNTER (EMERGENCY)
Facility: HOSPITAL | Age: 35
Discharge: HOME | End: 2025-03-17
Attending: EMERGENCY MEDICINE | Admitting: EMERGENCY MEDICINE

## 2025-03-16 DIAGNOSIS — R07.9 CHEST PAIN, UNSPECIFIED TYPE: ICD-10-CM

## 2025-03-16 LAB
ANION GAP SERPL CALCULATED.3IONS-SCNC: 11 MMOL/L (ref 7–15)
BASOPHILS # BLD AUTO: 0 10E3/UL (ref 0–0.2)
BASOPHILS NFR BLD AUTO: 0 %
BUN SERPL-MCNC: 11.1 MG/DL (ref 6–20)
CALCIUM SERPL-MCNC: 10 MG/DL (ref 8.8–10.4)
CHLORIDE SERPL-SCNC: 99 MMOL/L (ref 98–107)
CREAT SERPL-MCNC: 0.83 MG/DL (ref 0.67–1.17)
EGFRCR SERPLBLD CKD-EPI 2021: >90 ML/MIN/1.73M2
EOSINOPHIL # BLD AUTO: 0 10E3/UL (ref 0–0.7)
EOSINOPHIL NFR BLD AUTO: 0 %
ERYTHROCYTE [DISTWIDTH] IN BLOOD BY AUTOMATED COUNT: 11.6 % (ref 10–15)
GLUCOSE SERPL-MCNC: 225 MG/DL (ref 70–99)
HCO3 SERPL-SCNC: 27 MMOL/L (ref 22–29)
HCT VFR BLD AUTO: 46.4 % (ref 40–53)
HGB BLD-MCNC: 16.2 G/DL (ref 13.3–17.7)
IMM GRANULOCYTES # BLD: 0 10E3/UL
IMM GRANULOCYTES NFR BLD: 0 %
LYMPHOCYTES # BLD AUTO: 3.4 10E3/UL (ref 0.8–5.3)
LYMPHOCYTES NFR BLD AUTO: 46 %
MCH RBC QN AUTO: 29.4 PG (ref 26.5–33)
MCHC RBC AUTO-ENTMCNC: 34.9 G/DL (ref 31.5–36.5)
MCV RBC AUTO: 84 FL (ref 78–100)
MONOCYTES # BLD AUTO: 0.5 10E3/UL (ref 0–1.3)
MONOCYTES NFR BLD AUTO: 7 %
NEUTROPHILS # BLD AUTO: 3.4 10E3/UL (ref 1.6–8.3)
NEUTROPHILS NFR BLD AUTO: 46 %
NRBC # BLD AUTO: 0 10E3/UL
NRBC BLD AUTO-RTO: 0 /100
PLATELET # BLD AUTO: 261 10E3/UL (ref 150–450)
POTASSIUM SERPL-SCNC: 3.9 MMOL/L (ref 3.4–5.3)
RBC # BLD AUTO: 5.51 10E6/UL (ref 4.4–5.9)
SODIUM SERPL-SCNC: 137 MMOL/L (ref 135–145)
WBC # BLD AUTO: 7.3 10E3/UL (ref 4–11)

## 2025-03-16 PROCEDURE — 99285 EMERGENCY DEPT VISIT HI MDM: CPT | Mod: 25

## 2025-03-16 PROCEDURE — 82310 ASSAY OF CALCIUM: CPT | Performed by: EMERGENCY MEDICINE

## 2025-03-16 PROCEDURE — 85025 COMPLETE CBC W/AUTO DIFF WBC: CPT | Performed by: EMERGENCY MEDICINE

## 2025-03-16 PROCEDURE — 93005 ELECTROCARDIOGRAM TRACING: CPT | Performed by: EMERGENCY MEDICINE

## 2025-03-16 PROCEDURE — 80048 BASIC METABOLIC PNL TOTAL CA: CPT | Performed by: EMERGENCY MEDICINE

## 2025-03-16 PROCEDURE — 93005 ELECTROCARDIOGRAM TRACING: CPT | Performed by: STUDENT IN AN ORGANIZED HEALTH CARE EDUCATION/TRAINING PROGRAM

## 2025-03-16 PROCEDURE — 36415 COLL VENOUS BLD VENIPUNCTURE: CPT | Performed by: EMERGENCY MEDICINE

## 2025-03-16 ASSESSMENT — COLUMBIA-SUICIDE SEVERITY RATING SCALE - C-SSRS
1. IN THE PAST MONTH, HAVE YOU WISHED YOU WERE DEAD OR WISHED YOU COULD GO TO SLEEP AND NOT WAKE UP?: NO
2. HAVE YOU ACTUALLY HAD ANY THOUGHTS OF KILLING YOURSELF IN THE PAST MONTH?: NO
6. HAVE YOU EVER DONE ANYTHING, STARTED TO DO ANYTHING, OR PREPARED TO DO ANYTHING TO END YOUR LIFE?: NO

## 2025-03-16 NOTE — Clinical Note
Bryan Grant was seen and treated in our emergency department on 3/16/2025.  He may return to work on 03/17/2025.       If you have any questions or concerns, please don't hesitate to call.      Bibiana Thompson MD

## 2025-03-17 ENCOUNTER — APPOINTMENT (OUTPATIENT)
Dept: RADIOLOGY | Facility: HOSPITAL | Age: 35
End: 2025-03-17
Attending: EMERGENCY MEDICINE

## 2025-03-17 VITALS
TEMPERATURE: 98.1 F | OXYGEN SATURATION: 97 % | WEIGHT: 222 LBS | RESPIRATION RATE: 23 BRPM | HEART RATE: 88 BPM | BODY MASS INDEX: 32.88 KG/M2 | SYSTOLIC BLOOD PRESSURE: 142 MMHG | HEIGHT: 69 IN | DIASTOLIC BLOOD PRESSURE: 65 MMHG

## 2025-03-17 LAB
HOLD SPECIMEN: NORMAL
HOLD SPECIMEN: NORMAL

## 2025-03-17 PROCEDURE — 71046 X-RAY EXAM CHEST 2 VIEWS: CPT

## 2025-03-17 NOTE — DISCHARGE INSTRUCTIONS
Your chest x ray is normal. Your EKG is normal. Please follow up with your PCP for diabetes and blood pressure follow up within 1-2 weeks.

## 2025-03-17 NOTE — ED PROVIDER NOTES
EMERGENCY DEPARTMENT ENCOUNTER      NAME: Bryan Grant  AGE: 35 year old male  YOB: 1990  MRN: [id number]  EVALUATION DATE & TIME: 3/16/2025 11:34 PM    PCP: No Ref-Primary, Physician    ED PROVIDER: Bibiana Thompson M.D.      Chief Complaint   Patient presents with    Chest Pain       FINAL IMPRESSION:  1. Chest pain, unspecified type        ED COURSE & MEDICAL DECISION MAKIN. Chest tightness, resolved.  Suspected to be 2/2 COPD/asthma exacerbation vs reactive airway disease vs anxiety.  EKG non ischemic, has some risk factors for ACS but given young age and no hx of angina, doubt this is the cause of his symptoms today.  Doubt PE, no risk factors for such and asymptomatic at this time.  Elevated BG and blood pressure. Recommend seeing PCP for follow up soon.  CXR ordered and read by myself.  EKG read by myself.  DC home with strict return precautions and follow up.       11:51 PM I met with the patient to gather history and to perform my initial exam. I discussed the plan for :care while in the Emergency Department.  12:51 AM I rechecked and updated the patient on his lab results and imaging results. Discussed plan for discharge to home.     Pertinent Labs & Imaging studies reviewed. (See chart for details).    Medical Decision Making  Obtained supplemental history:Supplemental history obtained?: Documented in chart  Reviewed external records: External records reviewed?: Other: Documented in chart, if applicable  Care impacted by chronic illness:Documented in Chart  Did you consider but not order tests?: Work up considered but not performed and documented in chart, if applicable  Did you interpret images independently?: Independent interpretation of ECG and images noted in documentation, when applicable.  Consultation discussion with other provider:Did you involve another provider (consultant, MH, pharmacy, etc.)?: No  Discharge. No recommendations on prescription strength medication(s).  See documentation for any additional details.    MIPS (CTPE, Dental pain, [place], Sinusitis, Asthma/COPD, Head Trauma): Not Applicable    SEPSIS: None          At the conclusion of the encounter I discussed the results of all of the tests and the disposition. The questions were answered. The patient or family acknowledged understanding and was agreeable with the care plan.      CRITICAL CARE:  N/A    HPI    Patient information was obtained from: patient.    Use of : N/A.       Bryan Grant is a 35 year old male with PMHx of HTN, HLD, T2DM, DOUG and tobacco dependence syndrome, who presents with chest pain and tightness.    Patient reports that while he was at work, he coughed and then suddenly started experiencing pain starting at his back that radiated to his bilateral lower chest (over his bilateral ribs). Shortly after, he felt like he could not breath, but this episode lasted briefly (for a second). States that the intensity of pain is equal on both sides of his chest without worsening pain on one side. He felt an intense squeezing sensation over both his bilateral ribs and notes this episode only lasted for 45 minutes, but then resolved on its own. He started to feel anxious and became concerned that he may be having a heart attack, which prompted him to come in to the ED for evaluation tonight. States that he does not have this chest pain / tightness right now. He has a history of anxiety, but reports that his anxiety has been getting better.    Mentions that his kids was found to have crackles in their lungs and was treated for pneumonia, but they were not confirmed to have pneumonia via imaging. He denies having any new cough, fever, cold-like symptoms or any other sick symptoms.    He does smoke currently. He denies a personal history of asthma, COPD or any other restrictive airway disease. He indicates that he does not have a family history of blood clots or early heart  attacks.      REVIEW OF SYSTEMS  All other systems negative unless noted in HPI.    PAST MEDICAL HISTORY:  Past Medical History:   Diagnosis Date    Amblyopia     Anxiety     Essential hypertension 02/06/2017    Overview:  Hypertension (HTN) NOS Overview:  Overview:  Hypertension (HTN) NOS     Hyperlipidemia 02/27/2017    Overview:  Previously declined intervention. Overview:  Overview:  Previously declined intervention.     Recurrent major depressive disorder 11/14/2017    Overview:  Generalized anxiety disorder, panic disorder, Major depressive disorder,  recurrent, moderate, Rule out personality disorder per Psych. Zoloft 100  mg 1 tablet daily and Ativan 0.5 mg 1 tablet up to 2 times a day as needed  for episodes of severe anxiety or panic attacks prescribed by psych.   Continue to follow with psych/Behavioral Health.    Overview:  Overview:  Generalized anxiety     Shortness of breath 03/04/2015    Tobacco dependence syndrome 02/06/2017    Type 2 diabetes mellitus (H) 02/06/2017    Overview:  Diabetes Mellitus Type 2 Overview:  Overview:  Diabetes Mellitus Type 2        PAST SURGICAL HISTORY:  Past Surgical History:   Procedure Laterality Date    OTHER SURGICAL HISTORY      arm surgery         CURRENT MEDICATIONS:    No current facility-administered medications for this encounter.     Current Outpatient Medications   Medication Sig Dispense Refill    albuterol (PROAIR HFA/PROVENTIL HFA/VENTOLIN HFA) 108 (90 Base) MCG/ACT inhaler Inhale 2 puffs into the lungs every 6 hours as needed for shortness of breath, wheezing or cough. 18 g 3    albuterol (PROVENTIL) (2.5 MG/3ML) 0.083% neb solution Take 1 vial (2.5 mg) by nebulization every 6 hours as needed for shortness of breath, wheezing or cough (Patient not taking: Reported on 3/8/2025) 90 mL 0    alcohol swab prep pads Use to swab area of injection/samantha as directed (Patient not taking: Reported on 3/8/2025) 100 each 3    ALPRAZolam (XANAX) 0.25 MG tablet Take 1  tablet (0.25 mg) by mouth 2 times daily as needed for anxiety (Patient not taking: Reported on 3/8/2025) 30 tablet 0    ammonium lactate (LAC-HYDRIN) 12 % external lotion Apply topically 2 times daily (Patient not taking: Reported on 3/8/2025) 222 mL 1    amoxicillin-clavulanate (AUGMENTIN) 875-125 MG tablet Take 1 tablet by mouth 2 times daily. (Patient not taking: Reported on 3/8/2025) 28 tablet 0    benzonatate (TESSALON) 100 MG capsule Take 1 capsule (100 mg) by mouth 3 times daily as needed for cough (Patient not taking: Reported on 3/8/2025) 21 capsule 0    blood glucose (NO BRAND SPECIFIED) test strip Use to test blood sugar daily. To accompany: Blood Glucose Monitor Brands: per insurance. (Patient not taking: Reported on 3/8/2025) 100 strip 6    blood glucose calibration (NO BRAND SPECIFIED) solution Use to calibrate blood glucose monitor as needed as directed. To accompany: Blood Glucose Monitor Brands: per insurance. (Patient not taking: Reported on 3/8/2025) 1 each 0    blood glucose monitoring (NO BRAND SPECIFIED) meter device kit Use to test blood sugar daily or as directed. (Patient not taking: Reported on 3/8/2025) 1 kit 0    busPIRone (BUSPAR) 10 MG tablet Take 1 tablet (10 mg) by mouth 2 times daily (Patient not taking: Reported on 3/8/2025) 60 tablet 1    hydrALAZINE (APRESOLINE) 10 MG tablet Take 1 tablet (10 mg) by mouth every 8 hours as needed for high blood pressure (systolic blood pressure greater than 160 for one hour despite having taken your lisinopril). 20 tablet 0    hydrOXYzine (ATARAX) 25 MG tablet Take 1-2 tablets (25-50 mg) by mouth 3 times daily as needed for anxiety (Patient not taking: Reported on 3/8/2025) 270 tablet 2    insulin glargine (LANTUS SOLOSTAR) 100 UNIT/ML pen Inject 10 Units Subcutaneous At Bedtime (Patient not taking: Reported on 3/8/2025) 15 mL 1    insulin lispro (HUMALOG KWIKPEN) 100 UNIT/ML (1 unit dial) KWIKPEN Inject 3 Units Subcutaneous 3 times daily (before  meals) (Patient not taking: Reported on 3/8/2025) 10 mL 0    insulin pen needle (31G X 6 MM) 31G X 6 MM miscellaneous Use 4 pen needles daily or as directed. (Patient not taking: Reported on 3/8/2025) 400 each 0    lisinopril (ZESTRIL) 10 MG tablet Take 1 tablet (10 mg) by mouth daily. 30 tablet 0    lisinopril (ZESTRIL) 5 MG tablet Take 1 tablet (5 mg) by mouth daily (Patient not taking: Reported on 3/8/2025) 90 tablet 1    metFORMIN (GLUCOPHAGE) 1000 MG tablet Take 2,000 mg by mouth (Patient not taking: Reported on 3/8/2025)      ondansetron (ZOFRAN ODT) 8 MG ODT tab Take 1 tablet (8 mg) by mouth every 8 hours as needed for nausea (Patient not taking: Reported on 3/8/2025) 30 tablet 0    thin (NO BRAND SPECIFIED) lancets Use to test blood sugar daily or as directed. To accompany: Blood Glucose Monitor Brands: per insurance. (Patient not taking: Reported on 3/8/2025) 1 each 3         ALLERGIES:  Allergies   Allergen Reactions    Erythromycin Rash     Other reaction(s): as child    Erythromycin-Sulfisoxazole Unknown     Denies 22       FAMILY HISTORY:  Family History   Problem Relation Age of Onset    Cerebrovascular Disease Father         just  of massive stroke last week       SOCIAL HISTORY:  Social History     Socioeconomic History    Marital status: Single   Tobacco Use    Smoking status: Some Days     Current packs/day: 0.00     Types: Cigarettes     Last attempt to quit: 2019     Years since quittin.2    Smokeless tobacco: Never   Vaping Use    Vaping status: Every Day    Substances: Nicotine    Devices: Disposable   Substance and Sexual Activity    Alcohol use: No    Drug use: No    Sexual activity: Yes     Partners: Female     Social Drivers of Health     Food Insecurity: No Food Insecurity (3/4/2023)    [facility] from [facility]    Hunger Vital Sign     Worried About Running Out of Food in the Last Year:  "Never true     Ran Out of Food in the Last Year: Never true   Interpersonal Safety: Not At Risk (6/4/2023)    [facility] from [facility] IP Custom IPV     Do you feel UNSAFE in any of your personal relationships with your family members or any other acquaintances?: No       VITALS:  Patient Vitals for the past 24 hrs:   BP Temp Temp src Pulse Resp SpO2 Height Weight   03/17/25 0000 (!) 142/65 -- -- 88 23 97 % -- --   03/16/25 2345 (!) 167/87 -- -- 93 11 97 % -- --   03/16/25 2318 (!) 166/109 98.1  F (36.7  C) Oral 113 18 98 % 1.753 m (5' 9\") 100.7 kg (222 lb)       PHYSICAL EXAM    VITAL SIGNS: BP (!) 142/65   Pulse 88   Temp 98.1  F (36.7  C) (Oral)   Resp 23   Ht 1.753 m (5' 9\")   Wt 100.7 kg (222 lb)   SpO2 97%   BMI 32.78 kg/m    Physical Exam    LABS  Labs Ordered and Resulted from Time of ED Arrival to Time of ED Departure   BASIC METABOLIC PANEL - Abnormal       Result Value    Sodium 137      Potassium 3.9      Chloride 99      Carbon Dioxide (CO2) 27      Anion Gap 11      Urea Nitrogen 11.1      Creatinine 0.83      GFR Estimate >90      Calcium 10.0      Glucose 225 (*)    CBC WITH PLATELETS AND DIFFERENTIAL    WBC Count 7.3      RBC Count 5.51      Hemoglobin 16.2      Hematocrit 46.4      MCV 84      MCH 29.4      MCHC 34.9      RDW 11.6      Platelet Count 261      % Neutrophils 46      % Lymphocytes 46      % Monocytes 7      % Eosinophils 0      % Basophils 0      % Immature Granulocytes 0      NRBCs per 100 WBC 0      Absolute Neutrophils 3.4      Absolute Lymphocytes 3.4      Absolute Monocytes 0.5      Absolute Eosinophils 0.0      Absolute Basophils 0.0      Absolute Immature Granulocytes 0.0      Absolute NRBCs 0.0           RADIOLOGY  Chest XR,  PA & LAT   Final Result   IMPRESSION: Negative chest.         I have independently reviewed the above image. See radiology report for detail.      EKG:    EKG obtained at 2327 and shows sinus rhythm rate " 100, Qtc 438, compared to previous EKG on 11/16/24. I have independently reviewed and interpreted today's EKG, pending Cardiologist read.     PROCEDURES:  N/A      MEDICATIONS GIVEN IN THE EMERGENCY:  Medications - No data to display    NEW PRESCRIPTIONS STARTED AT TODAY'S ER VISIT  New Prescriptions    No medications on file        I, Linda Bradford, am serving as a scribe to document services personally performed by Bibiana Thompson MD, based on my observations and the provider's statements to me.  I, Bibiana Thompson MD, attest that Linda Bradford is acting in a scribe capacity, has observed my performance of the services and has documented them in accordance with my direction.     Bibiana Thompson MD  Emergency Medicine  [facility] EMERGENCY DEPARTMENT  [facility] [facility] [address] [phone]  Dept: [phone]            Bibiana Thompson MD  03/17/25 0103

## 2025-03-17 NOTE — ED TRIAGE NOTES
Pt here d/t chest tightness under arms when taking a deep breath. States it started a while after he ate and a hard cough he had. Denies radiation of pain, N/V, sweating, or dizziness. Also states his son had pneumonia recently     Triage Assessment (Adult)       Row Name 03/16/25 2786          Triage Assessment    Airway WDL WDL        Respiratory WDL    Respiratory WDL WDL        Cognitive/Neuro/Behavioral WDL    Cognitive/Neuro/Behavioral WDL WDL

## 2025-03-19 LAB
ATRIAL RATE - MUSE: 100 BPM
DIASTOLIC BLOOD PRESSURE - MUSE: NORMAL MMHG
INTERPRETATION ECG - MUSE: NORMAL
P AXIS - MUSE: 50 DEGREES
PR INTERVAL - MUSE: 144 MS
QRS DURATION - MUSE: 80 MS
QT - MUSE: 340 MS
QTC - MUSE: 438 MS
R AXIS - MUSE: 65 DEGREES
SYSTOLIC BLOOD PRESSURE - MUSE: NORMAL MMHG
T AXIS - MUSE: 70 DEGREES
VENTRICULAR RATE- MUSE: 100 BPM

## 2025-03-29 ENCOUNTER — HEALTH MAINTENANCE LETTER (OUTPATIENT)
Age: 35
End: 2025-03-29